# Patient Record
Sex: MALE | Race: WHITE | NOT HISPANIC OR LATINO | Employment: FULL TIME | URBAN - NONMETROPOLITAN AREA
[De-identification: names, ages, dates, MRNs, and addresses within clinical notes are randomized per-mention and may not be internally consistent; named-entity substitution may affect disease eponyms.]

---

## 2021-08-03 ENCOUNTER — APPOINTMENT (OUTPATIENT)
Dept: CT IMAGING | Facility: HOSPITAL | Age: 66
End: 2021-08-03

## 2021-08-03 ENCOUNTER — APPOINTMENT (OUTPATIENT)
Dept: GENERAL RADIOLOGY | Facility: HOSPITAL | Age: 66
End: 2021-08-03

## 2021-08-03 ENCOUNTER — HOSPITAL ENCOUNTER (OUTPATIENT)
Facility: HOSPITAL | Age: 66
Setting detail: OBSERVATION
LOS: 1 days | Discharge: HOME | End: 2021-08-05
Attending: STUDENT IN AN ORGANIZED HEALTH CARE EDUCATION/TRAINING PROGRAM | Admitting: INTERNAL MEDICINE

## 2021-08-03 DIAGNOSIS — I63.432 CEREBROVASCULAR ACCIDENT (CVA) DUE TO EMBOLISM OF LEFT POSTERIOR CEREBRAL ARTERY (HCC): ICD-10-CM

## 2021-08-03 DIAGNOSIS — Z74.09 DECREASED FUNCTIONAL MOBILITY AND ENDURANCE: ICD-10-CM

## 2021-08-03 DIAGNOSIS — R20.0 FACIAL NUMBNESS: Primary | ICD-10-CM

## 2021-08-03 DIAGNOSIS — Z78.9 IMPAIRED MOBILITY AND ACTIVITIES OF DAILY LIVING: ICD-10-CM

## 2021-08-03 DIAGNOSIS — I48.91 ATRIAL FIBRILLATION, UNSPECIFIED TYPE (HCC): ICD-10-CM

## 2021-08-03 DIAGNOSIS — Z74.09 IMPAIRED MOBILITY AND ACTIVITIES OF DAILY LIVING: ICD-10-CM

## 2021-08-03 LAB
ABO GROUP BLD: NORMAL
ALBUMIN SERPL-MCNC: 4 G/DL (ref 3.5–5.2)
ALBUMIN/GLOB SERPL: 1.5 G/DL
ALP SERPL-CCNC: 85 U/L (ref 39–117)
ALT SERPL W P-5'-P-CCNC: 12 U/L (ref 1–41)
ANION GAP SERPL CALCULATED.3IONS-SCNC: 10 MMOL/L (ref 5–15)
AST SERPL-CCNC: 11 U/L (ref 1–40)
BASOPHILS # BLD AUTO: 0.04 10*3/MM3 (ref 0–0.2)
BASOPHILS NFR BLD AUTO: 0.4 % (ref 0–1.5)
BILIRUB SERPL-MCNC: 0.2 MG/DL (ref 0–1.2)
BLD GP AB SCN SERPL QL: NEGATIVE
BUN SERPL-MCNC: 11 MG/DL (ref 8–23)
BUN/CREAT SERPL: 9.8 (ref 7–25)
CALCIUM SPEC-SCNC: 8.7 MG/DL (ref 8.6–10.5)
CHLORIDE SERPL-SCNC: 103 MMOL/L (ref 98–107)
CO2 SERPL-SCNC: 25 MMOL/L (ref 22–29)
CREAT SERPL-MCNC: 1.12 MG/DL (ref 0.76–1.27)
DEPRECATED RDW RBC AUTO: 46.8 FL (ref 37–54)
EOSINOPHIL # BLD AUTO: 0.2 10*3/MM3 (ref 0–0.4)
EOSINOPHIL NFR BLD AUTO: 1.9 % (ref 0.3–6.2)
ERYTHROCYTE [DISTWIDTH] IN BLOOD BY AUTOMATED COUNT: 13.2 % (ref 12.3–15.4)
FLUAV RNA RESP QL NAA+PROBE: NOT DETECTED
FLUBV RNA RESP QL NAA+PROBE: NOT DETECTED
GFR SERPL CREATININE-BSD FRML MDRD: 66 ML/MIN/1.73
GLOBULIN UR ELPH-MCNC: 2.6 GM/DL
GLUCOSE SERPL-MCNC: 97 MG/DL (ref 65–99)
HCT VFR BLD AUTO: 42 % (ref 37.5–51)
HGB BLD-MCNC: 14.1 G/DL (ref 13–17.7)
HOLD SPECIMEN: NORMAL
HOLD SPECIMEN: NORMAL
IMM GRANULOCYTES # BLD AUTO: 0.04 10*3/MM3 (ref 0–0.05)
IMM GRANULOCYTES NFR BLD AUTO: 0.4 % (ref 0–0.5)
INR PPP: 0.91 (ref 0.8–1.2)
LYMPHOCYTES # BLD AUTO: 1.55 10*3/MM3 (ref 0.7–3.1)
LYMPHOCYTES NFR BLD AUTO: 14.7 % (ref 19.6–45.3)
Lab: NORMAL
MCH RBC QN AUTO: 32.1 PG (ref 26.6–33)
MCHC RBC AUTO-ENTMCNC: 33.6 G/DL (ref 31.5–35.7)
MCV RBC AUTO: 95.7 FL (ref 79–97)
MONOCYTES # BLD AUTO: 0.63 10*3/MM3 (ref 0.1–0.9)
MONOCYTES NFR BLD AUTO: 6 % (ref 5–12)
NEUTROPHILS NFR BLD AUTO: 76.6 % (ref 42.7–76)
NEUTROPHILS NFR BLD AUTO: 8.08 10*3/MM3 (ref 1.7–7)
NRBC BLD AUTO-RTO: 0 /100 WBC (ref 0–0.2)
PLATELET # BLD AUTO: 244 10*3/MM3 (ref 140–450)
PMV BLD AUTO: 10 FL (ref 6–12)
POTASSIUM SERPL-SCNC: 3.9 MMOL/L (ref 3.5–5.2)
PROT SERPL-MCNC: 6.6 G/DL (ref 6–8.5)
PROTHROMBIN TIME: 12.2 SECONDS (ref 11.1–15.3)
RBC # BLD AUTO: 4.39 10*6/MM3 (ref 4.14–5.8)
RH BLD: POSITIVE
SARS-COV-2 RNA RESP QL NAA+PROBE: NOT DETECTED
SODIUM SERPL-SCNC: 138 MMOL/L (ref 136–145)
T&S EXPIRATION DATE: NORMAL
WBC # BLD AUTO: 10.54 10*3/MM3 (ref 3.4–10.8)
WHOLE BLOOD HOLD SPECIMEN: NORMAL

## 2021-08-03 PROCEDURE — 86850 RBC ANTIBODY SCREEN: CPT | Performed by: STUDENT IN AN ORGANIZED HEALTH CARE EDUCATION/TRAINING PROGRAM

## 2021-08-03 PROCEDURE — 70450 CT HEAD/BRAIN W/O DYE: CPT

## 2021-08-03 PROCEDURE — G0378 HOSPITAL OBSERVATION PER HR: HCPCS

## 2021-08-03 PROCEDURE — 87636 SARSCOV2 & INF A&B AMP PRB: CPT | Performed by: STUDENT IN AN ORGANIZED HEALTH CARE EDUCATION/TRAINING PROGRAM

## 2021-08-03 PROCEDURE — 86901 BLOOD TYPING SEROLOGIC RH(D): CPT | Performed by: STUDENT IN AN ORGANIZED HEALTH CARE EDUCATION/TRAINING PROGRAM

## 2021-08-03 PROCEDURE — C9803 HOPD COVID-19 SPEC COLLECT: HCPCS

## 2021-08-03 PROCEDURE — 82962 GLUCOSE BLOOD TEST: CPT

## 2021-08-03 PROCEDURE — 70498 CT ANGIOGRAPHY NECK: CPT

## 2021-08-03 PROCEDURE — 86900 BLOOD TYPING SEROLOGIC ABO: CPT | Performed by: STUDENT IN AN ORGANIZED HEALTH CARE EDUCATION/TRAINING PROGRAM

## 2021-08-03 PROCEDURE — 70496 CT ANGIOGRAPHY HEAD: CPT

## 2021-08-03 PROCEDURE — 85610 PROTHROMBIN TIME: CPT | Performed by: STUDENT IN AN ORGANIZED HEALTH CARE EDUCATION/TRAINING PROGRAM

## 2021-08-03 PROCEDURE — 25010000002 ENOXAPARIN PER 10 MG: Performed by: INTERNAL MEDICINE

## 2021-08-03 PROCEDURE — 96372 THER/PROPH/DIAG INJ SC/IM: CPT

## 2021-08-03 PROCEDURE — 93010 ELECTROCARDIOGRAM REPORT: CPT | Performed by: INTERNAL MEDICINE

## 2021-08-03 PROCEDURE — 93005 ELECTROCARDIOGRAM TRACING: CPT | Performed by: STUDENT IN AN ORGANIZED HEALTH CARE EDUCATION/TRAINING PROGRAM

## 2021-08-03 PROCEDURE — 99284 EMERGENCY DEPT VISIT MOD MDM: CPT

## 2021-08-03 PROCEDURE — 0 IOPAMIDOL PER 1 ML: Performed by: STUDENT IN AN ORGANIZED HEALTH CARE EDUCATION/TRAINING PROGRAM

## 2021-08-03 PROCEDURE — 85025 COMPLETE CBC W/AUTO DIFF WBC: CPT | Performed by: STUDENT IN AN ORGANIZED HEALTH CARE EDUCATION/TRAINING PROGRAM

## 2021-08-03 PROCEDURE — 80053 COMPREHEN METABOLIC PANEL: CPT | Performed by: STUDENT IN AN ORGANIZED HEALTH CARE EDUCATION/TRAINING PROGRAM

## 2021-08-03 PROCEDURE — 71045 X-RAY EXAM CHEST 1 VIEW: CPT

## 2021-08-03 RX ORDER — SODIUM CHLORIDE 0.9 % (FLUSH) 0.9 %
10 SYRINGE (ML) INJECTION EVERY 12 HOURS SCHEDULED
Status: DISCONTINUED | OUTPATIENT
Start: 2021-08-03 | End: 2021-08-05 | Stop reason: HOSPADM

## 2021-08-03 RX ORDER — HYDROCODONE BITARTRATE AND ACETAMINOPHEN 5; 325 MG/1; MG/1
1 TABLET ORAL EVERY 4 HOURS PRN
Status: DISCONTINUED | OUTPATIENT
Start: 2021-08-03 | End: 2021-08-05 | Stop reason: HOSPADM

## 2021-08-03 RX ORDER — SODIUM CHLORIDE 0.9 % (FLUSH) 0.9 %
10 SYRINGE (ML) INJECTION AS NEEDED
Status: DISCONTINUED | OUTPATIENT
Start: 2021-08-03 | End: 2021-08-05 | Stop reason: HOSPADM

## 2021-08-03 RX ORDER — ERGOCALCIFEROL 1.25 MG/1
50000 CAPSULE ORAL
COMMUNITY
Start: 2021-07-02 | End: 2022-07-03

## 2021-08-03 RX ORDER — ASPIRIN 81 MG/1
81 TABLET, CHEWABLE ORAL DAILY
Status: DISCONTINUED | OUTPATIENT
Start: 2021-08-04 | End: 2021-08-04

## 2021-08-03 RX ORDER — CLOPIDOGREL BISULFATE 75 MG/1
300 TABLET ORAL ONCE
Status: COMPLETED | OUTPATIENT
Start: 2021-08-03 | End: 2021-08-03

## 2021-08-03 RX ORDER — HYDROCODONE BITARTRATE AND ACETAMINOPHEN 7.5; 325 MG/1; MG/1
1 TABLET ORAL EVERY 6 HOURS PRN
COMMUNITY
Start: 2021-07-30 | End: 2021-08-30

## 2021-08-03 RX ORDER — ASPIRIN 81 MG/1
324 TABLET, CHEWABLE ORAL ONCE
Status: COMPLETED | OUTPATIENT
Start: 2021-08-03 | End: 2021-08-03

## 2021-08-03 RX ORDER — ATORVASTATIN CALCIUM 40 MG/1
40 TABLET, FILM COATED ORAL NIGHTLY
Status: DISCONTINUED | OUTPATIENT
Start: 2021-08-03 | End: 2021-08-05 | Stop reason: HOSPADM

## 2021-08-03 RX ADMIN — ASPIRIN 324 MG: 81 TABLET, CHEWABLE ORAL at 18:58

## 2021-08-03 RX ADMIN — ENOXAPARIN SODIUM 40 MG: 40 INJECTION SUBCUTANEOUS at 23:05

## 2021-08-03 RX ADMIN — ATORVASTATIN CALCIUM 40 MG: 40 TABLET, FILM COATED ORAL at 23:04

## 2021-08-03 RX ADMIN — IOPAMIDOL 90 ML: 755 INJECTION, SOLUTION INTRAVENOUS at 18:44

## 2021-08-03 RX ADMIN — SODIUM CHLORIDE, POTASSIUM CHLORIDE, SODIUM LACTATE AND CALCIUM CHLORIDE 1000 ML: 600; 310; 30; 20 INJECTION, SOLUTION INTRAVENOUS at 19:01

## 2021-08-03 RX ADMIN — SODIUM CHLORIDE, PRESERVATIVE FREE 10 ML: 5 INJECTION INTRAVENOUS at 23:05

## 2021-08-03 RX ADMIN — CLOPIDOGREL BISULFATE 300 MG: 75 TABLET ORAL at 18:59

## 2021-08-03 NOTE — ED PROVIDER NOTES
Subjective   65-year-old male comes to the ER chief complaint of vision change, right-sided face numbness and droop that he noticed this morning when he woke up.  Last known normal was last night.  Patient did not think much of it and went to work when he noticed that he could not see out of the left side.  Patient reports having a history of an irregular heartbeat, but is not on a blood thinner.  He smokes daily and was diagnosed with high blood pressure in the past.  His symptoms currently have completely resolved except for some mild numbness on the right side.      History provided by:  Patient   used: No        Review of Systems   Constitutional: Negative for activity change, appetite change, chills, diaphoresis, fatigue and fever.   HENT: Negative for congestion and rhinorrhea.    Eyes: Positive for visual disturbance.   Respiratory: Negative for cough and shortness of breath.    Cardiovascular: Negative for chest pain and palpitations.   Gastrointestinal: Negative for abdominal pain and nausea.   Genitourinary: Negative for dysuria and flank pain.   Skin: Negative for color change and rash.   Neurological: Positive for facial asymmetry and numbness. Negative for dizziness, tremors, seizures, syncope, speech difficulty, weakness, light-headedness and headaches.   Psychiatric/Behavioral: Negative for agitation. The patient is not nervous/anxious.        Past Medical History:   Diagnosis Date   • Hypertension        No Known Allergies    History reviewed. No pertinent surgical history.    History reviewed. No pertinent family history.    Social History     Socioeconomic History   • Marital status: Single     Spouse name: Not on file   • Number of children: Not on file   • Years of education: Not on file   • Highest education level: Not on file   Tobacco Use   • Smoking status: Current Every Day Smoker     Packs/day: 1.00     Types: Cigars   • Smokeless tobacco: Never Used           Objective   "  Vitals:    08/03/21 1729 08/03/21 1752 08/03/21 1859 08/03/21 2011   BP: 139/95  120/85 145/91   BP Location: Right arm  Left arm Left arm   Patient Position: Sitting  Lying Lying   Pulse: 96  82 72   Resp: 20  16 16   Temp: 97.2 °F (36.2 °C)      TempSrc: Temporal      SpO2: 98%  98% 98%   Weight:  86.3 kg (190 lb 3 oz)     Height:  177.8 cm (70\")         Physical Exam  Vitals and nursing note reviewed.   Constitutional:       General: He is not in acute distress.     Appearance: He is well-developed. He is not ill-appearing, toxic-appearing or diaphoretic.   HENT:      Head: Normocephalic.      Right Ear: External ear normal.      Left Ear: External ear normal.   Eyes:      General: No visual field deficit.  Pulmonary:      Effort: Pulmonary effort is normal. No accessory muscle usage or respiratory distress.      Breath sounds: No decreased breath sounds or wheezing.   Chest:      Chest wall: No tenderness.   Abdominal:      General: Bowel sounds are normal.      Palpations: Abdomen is soft.      Tenderness: There is no abdominal tenderness (deep palpation).   Skin:     General: Skin is warm and dry.      Capillary Refill: Capillary refill takes less than 2 seconds.   Neurological:      Mental Status: He is alert and oriented to person, place, and time. Mental status is at baseline. He is not disoriented.      Cranial Nerves: No dysarthria or facial asymmetry.      Sensory: Sensory deficit (minimal right side face) present.      Motor: Motor function is intact. No weakness.      Coordination: Coordination is intact.   Psychiatric:         Behavior: Behavior normal.         ECG 12 Lead      Date/Time: 8/3/2021 8:35 PM  Performed by: Thierry Chowdhury MD  Authorized by: Johnson Amaya MD   Interpreted by physician  Rhythm: atrial fibrillation  Rate: normal  BPM: 80  ST Segments: ST segments normal                   ED Course  ED Course as of Aug 03 2039   Tue Aug 03, 2021   1847 Patient checked out to Dr." [place] pending dispo.    []   2036 Findings discussed with patient and family member at bedside in the emergency department.  Dr. French is aware of the imaging results, and will continue to follow patient in hospital.    [CB]      ED Course User Index  [] Johnson Amaya MD  [CB] Thierry Chowdhury MD      Results for orders placed or performed during the hospital encounter of 08/03/21   COVID-19 and FLU A/B PCR - Swab, Nasopharynx    Specimen: Nasopharynx; Swab   Result Value Ref Range    COVID19 Not Detected Not Detected - Ref. Range    Influenza A PCR Not Detected Not Detected    Influenza B PCR Not Detected Not Detected   Comprehensive Metabolic Panel    Specimen: Blood   Result Value Ref Range    Glucose 97 65 - 99 mg/dL    BUN 11 8 - 23 mg/dL    Creatinine 1.12 0.76 - 1.27 mg/dL    Sodium 138 136 - 145 mmol/L    Potassium 3.9 3.5 - 5.2 mmol/L    Chloride 103 98 - 107 mmol/L    CO2 25.0 22.0 - 29.0 mmol/L    Calcium 8.7 8.6 - 10.5 mg/dL    Total Protein 6.6 6.0 - 8.5 g/dL    Albumin 4.00 3.50 - 5.20 g/dL    ALT (SGPT) 12 1 - 41 U/L    AST (SGOT) 11 1 - 40 U/L    Alkaline Phosphatase 85 39 - 117 U/L    Total Bilirubin 0.2 0.0 - 1.2 mg/dL    eGFR Non African Amer 66 >60 mL/min/1.73    Globulin 2.6 gm/dL    A/G Ratio 1.5 g/dL    BUN/Creatinine Ratio 9.8 7.0 - 25.0    Anion Gap 10.0 5.0 - 15.0 mmol/L   Protime-INR    Specimen: Blood   Result Value Ref Range    Protime 12.2 11.1 - 15.3 Seconds    INR 0.91 0.80 - 1.20   CBC Auto Differential    Specimen: Blood   Result Value Ref Range    WBC 10.54 3.40 - 10.80 10*3/mm3    RBC 4.39 4.14 - 5.80 10*6/mm3    Hemoglobin 14.1 13.0 - 17.7 g/dL    Hematocrit 42.0 37.5 - 51.0 %    MCV 95.7 79.0 - 97.0 fL    MCH 32.1 26.6 - 33.0 pg    MCHC 33.6 31.5 - 35.7 g/dL    RDW 13.2 12.3 - 15.4 %    RDW-SD 46.8 37.0 - 54.0 fl    MPV 10.0 6.0 - 12.0 fL    Platelets 244 140 - 450 10*3/mm3    Neutrophil % 76.6 (H) 42.7 - 76.0 %    Lymphocyte % 14.7 (L) 19.6 - 45.3 %    Monocyte %  6.0 5.0 - 12.0 %    Eosinophil % 1.9 0.3 - 6.2 %    Basophil % 0.4 0.0 - 1.5 %    Immature Grans % 0.4 0.0 - 0.5 %    Neutrophils, Absolute 8.08 (H) 1.70 - 7.00 10*3/mm3    Lymphocytes, Absolute 1.55 0.70 - 3.10 10*3/mm3    Monocytes, Absolute 0.63 0.10 - 0.90 10*3/mm3    Eosinophils, Absolute 0.20 0.00 - 0.40 10*3/mm3    Basophils, Absolute 0.04 0.00 - 0.20 10*3/mm3    Immature Grans, Absolute 0.04 0.00 - 0.05 10*3/mm3    nRBC 0.0 0.0 - 0.2 /100 WBC   Type & Screen    Specimen: Blood   Result Value Ref Range    ABO Type O     RH type Positive     Antibody Screen Negative     T&S Expiration Date 8/6/2021 11:59:59 PM    PREVIOUS HISTORY    Specimen: Blood   Result Value Ref Range    Previous History No record on File    Green Top (Gel)   Result Value Ref Range    Extra Tube Hold for add-ons.    Lavender Top   Result Value Ref Range    Extra Tube hold for add-on    Gold Top - SST   Result Value Ref Range    Extra Tube Hold for add-ons.      CT Angiogram Head w AI Analysis of LVO   Final Result   1.  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 There is   collateral supply to the left PCA cortical branches. Follow-up   MRI is recommended.   2.  No other abnormality of the CTA brain.   3.  Negative CTA of the neck.      Electronically signed by:  Jeffrey Nails MD  8/3/2021 7:40 PM   CDT Workstation: [id number]      CT Angiogram Neck   Final Result   1.  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 There is   collateral supply to the left PCA cortical branches. Follow-up   MRI is recommended.   2.  No other abnormality of the CTA brain.   3.  Negative CTA of the neck.      Electronically signed by:  Jeffrey Nails MD  8/3/2021 7:40 PM   CDT Workstation: [id number]      XR Chest 1 View   Final Result   CONCLUSION:   No Acute Disease      [id number]      Electronically signed by:  Jeffery Dixon MD  8/3/2021 6:24 PM CDT   Workstation: [id number]      CT Head Without Contrast Stroke Protocol   Final Result    CONCLUSION:   No acute process.   Cerebral and cerebellar atrophy.   4 cm greatest dimension area of encephalomalacia right frontal   lobe, old infarct versus trauma or other insult.      [id number]      Electronically signed by:  Jeffery Dixon MD  8/3/2021 6:00 PM CDT   Workstation: [id number]            NIHSS (NIH Stroke Scale/Score) reviewed and/or performed as part of the patient evaluation and treatment planning process.  The result associated with this review/performance is: 1       MDM    Final diagnoses:   Facial numbness   Cerebrovascular accident (CVA) due to embolism of left posterior cerebral artery (CMS/HCC)   Atrial fibrillation, unspecified type (CMS/HCC)       ED Disposition  ED Disposition     ED Disposition Condition Comment    Decision to Admit  Level of Care: Stepdown [25]   Diagnosis: Facial numbness [035052]   Admitting Physician: GRISEL CORMIER [[id number]]   Attending Physician: GRISEL CORMIER [[id number]]            No follow-up provider specified.       Medication List      No changes were made to your prescriptions during this visit.          Thierry Chowdhury MD  08/03/21 2039       Thierry Chowdhury MD  08/03/21 2039

## 2021-08-04 ENCOUNTER — APPOINTMENT (OUTPATIENT)
Dept: CARDIOLOGY | Facility: HOSPITAL | Age: 66
End: 2021-08-04

## 2021-08-04 ENCOUNTER — APPOINTMENT (OUTPATIENT)
Dept: MRI IMAGING | Facility: HOSPITAL | Age: 66
End: 2021-08-04

## 2021-08-04 LAB
CHOLEST SERPL-MCNC: 183 MG/DL (ref 0–200)
GLUCOSE BLDC GLUCOMTR-MCNC: 95 MG/DL (ref 70–130)
HBA1C MFR BLD: 5.3 % (ref 4.8–5.6)
HDLC SERPL-MCNC: 34 MG/DL (ref 40–60)
LDLC SERPL CALC-MCNC: 126 MG/DL (ref 0–100)
LDLC/HDLC SERPL: 3.63 {RATIO}
LV EF 2D ECHO EST: 54 %
MAXIMAL PREDICTED HEART RATE: 157 BPM
STRESS TARGET HR: 133 BPM
TRIGL SERPL-MCNC: 128 MG/DL (ref 0–150)
VLDLC SERPL-MCNC: 23 MG/DL (ref 5–40)

## 2021-08-04 PROCEDURE — G0378 HOSPITAL OBSERVATION PER HR: HCPCS

## 2021-08-04 PROCEDURE — 80061 LIPID PANEL: CPT | Performed by: INTERNAL MEDICINE

## 2021-08-04 PROCEDURE — 93306 TTE W/DOPPLER COMPLETE: CPT

## 2021-08-04 PROCEDURE — 83036 HEMOGLOBIN GLYCOSYLATED A1C: CPT | Performed by: INTERNAL MEDICINE

## 2021-08-04 PROCEDURE — 97165 OT EVAL LOW COMPLEX 30 MIN: CPT

## 2021-08-04 PROCEDURE — 99204 OFFICE O/P NEW MOD 45 MIN: CPT | Performed by: NURSE PRACTITIONER

## 2021-08-04 PROCEDURE — 93306 TTE W/DOPPLER COMPLETE: CPT | Performed by: INTERNAL MEDICINE

## 2021-08-04 PROCEDURE — 36415 COLL VENOUS BLD VENIPUNCTURE: CPT | Performed by: INTERNAL MEDICINE

## 2021-08-04 PROCEDURE — 70551 MRI BRAIN STEM W/O DYE: CPT

## 2021-08-04 PROCEDURE — 97161 PT EVAL LOW COMPLEX 20 MIN: CPT

## 2021-08-04 PROCEDURE — 82962 GLUCOSE BLOOD TEST: CPT

## 2021-08-04 RX ORDER — HYDRALAZINE HYDROCHLORIDE 20 MG/ML
10 INJECTION INTRAMUSCULAR; INTRAVENOUS EVERY 6 HOURS PRN
Status: DISCONTINUED | OUTPATIENT
Start: 2021-08-04 | End: 2021-08-05 | Stop reason: HOSPADM

## 2021-08-04 RX ADMIN — APIXABAN 5 MG: 5 TABLET, FILM COATED ORAL at 23:21

## 2021-08-04 RX ADMIN — SODIUM CHLORIDE, PRESERVATIVE FREE 10 ML: 5 INJECTION INTRAVENOUS at 09:15

## 2021-08-04 RX ADMIN — ATORVASTATIN CALCIUM 40 MG: 40 TABLET, FILM COATED ORAL at 23:21

## 2021-08-04 NOTE — CONSULTS
"Stroke Consult Note    Patient Name: Filiberto Lynch   MRN: [id number]  Age: 65 y.o.  Sex: male  : 1955    Primary Care Physician: Refugio Espinoza MD  Referring Physician:  Thierry Chowdhury MD    TIME STROKE TEAM CALLED: 2030 CST     TIME PATIENT SEEN: 08:00 CST not acute, symptoms resolved    Handedness: Right  Race: White     Chief Complaint/Reason for Consultation: Right face numbness    HPI: Pt is a 65-yr-old right-handed white male with known diagnosis of Afib, not on anticoagulation d/t bruising, and smoker (Smokes one cigar/day)  presented with c/o right face numbness that he woke up with yesterday around 11:30. Stated his right lip and cheek were numb for about 30 minutes, along with some vision changes in his right eye in which he states \"the vision was gone in part of my eye.\" Stated the vision issue also lasted 30 mins. Stated he had some disorientation during this time as well. This morning he states feeling back to baseline. Strengths are equal 5/5, denies numbness or vision issues.     Last Known Normal Date/Time: 8/3 @ 11:30 CST     Review of Systems   HENT: Negative.    Respiratory: Negative.    Cardiovascular:        Afib   Gastrointestinal: Negative.    Genitourinary: Negative.    Musculoskeletal: Negative.    Skin: Negative.    Neurological: Negative.    Hematological: Negative.    Psychiatric/Behavioral: Negative.         Temp:  [97.2 °F (36.2 °C)] 97.2 °F (36.2 °C)  Heart Rate:  [72-96] 72  Resp:  [16-20] 16  BP: (120-145)/(85-95) 145/91    Neurological Exam  Mental Status  Awake, alert and oriented to person, place and time.Alert. Recent and remote memory are intact. Speech is normal. Language is fluent with no aphasia. Attention and concentration are normal. Fund of knowledge is appropriate for level of education.    Cranial Nerves  CN II: Visual acuity is normal. Visual fields full to confrontation.  CN III, IV, VI: Extraocular movements intact bilaterally. Normal lids and " orbits bilaterally. Pupils equal round and reactive to light bilaterally.  CN V: Facial sensation is normal.  CN VII: Full and symmetric facial movement.  CN IX, X: Palate elevates symmetrically. Normal gag reflex.  CN XI: Shoulder shrug strength is normal.  CN XII: Tongue midline without atrophy or fasciculations.    Motor  Normal muscle bulk throughout. No fasciculations present. Normal muscle tone. Strength is 5/5 throughout all four extremities.    Sensory  Sensation is intact to light touch, pinprick, vibration and proprioception in all four extremities.    Reflexes  Not assessed.    Coordination  Finger-to-nose, rapid alternating movements and heel-to-shin normal bilaterally without dysmetria.    Gait  Not assessed.      Physical Exam  Vitals and nursing note reviewed.   Constitutional:       Appearance: Normal appearance.   HENT:      Head: Normocephalic and atraumatic.   Eyes:      General: Lids are normal.      Extraocular Movements: Extraocular movements intact.      Pupils: Pupils are equal, round, and reactive to light.   Cardiovascular:      Rate and Rhythm: Rhythm irregular.   Pulmonary:      Effort: Pulmonary effort is normal.   Musculoskeletal:         General: Normal range of motion.      Cervical back: Normal range of motion.   Skin:     General: Skin is warm and dry.   Neurological:      Mental Status: He is alert and oriented to person, place, and time. Mental status is at baseline.      Coordination: Coordination is intact.      Deep Tendon Reflexes: Strength normal.   Psychiatric:         Mood and Affect: Mood normal.         Speech: Speech normal.         Acute Stroke Data    Alteplase (tPA) Inclusion / Exclusion Criteria    Time: 08:09 CDT  Person Administering Scale: ANTONIO Aguilar    Inclusion Criteria  [x]   18 years of age or greater   [x]   Onset of symptoms < 4.5 hours before beginning treatment (stroke onset = time patient was last seen well or without symptoms).   []   Diagnosis  of acute ischemic stroke causing measurable disabling deficit (Complete Hemianopia, Any Aphasia, Visual or Sensory Extinction, Any weakness limiting sustained effort against gravity)   []   Any remaining deficit considered potentially disabling in view of patient and practitioner   Exclusion criteria (Do not proceed with Alteplase if any are checked under exclusion criteria)  []   Onset unknown or GREATER than 4.5 hours   []   ICH on CT/MRI   []   CT demonstrates hypodensity representing acute or subacute infarct   []   Significant head trauma or prior stroke in the previous 3 months   []   Symptoms suggestive of subarachnoid hemorrhage   []   History of un-ruptured intracranial aneurysm GREATER than 10 mm   []   Recent intracranial or intraspinal surgery within the last 3 months   []   Arterial puncture at a non-compressible site in the previous 7 days   []   Active internal bleeding   []   Acute bleeding tendency   []   Platelet count LESS than 100,000 for known hematological diseases such as leukemia, thrombocytopenia or chronic cirrhosis   []   Current use of anticoagulant with INR GREATER than 1.7 or PT GREATER than 15 seconds, aPTT GREATER than 40 seconds   []   Heparin received within 48 hours, resulting in abnormally elevated aPTT GREATER than upper limit of normal   []   Current use of direct thrombin inhibitors or direct factor Xa inhibitors in the past 48 hours   []   Elevated blood pressure refractory to treatment (systolic GREATER than 185 mm/Hg or diastolic  GREATER than 110 mm/Hg   []   Suspected infective endocarditis and aortic arch dissection   []   Current use of therapeutic treatment dose of low-molecular-weight heparin (LMWH) within the previous 24 hours   []   Structural GI malignancy or bleed   Relative exclusion for all patients  []   Only minor non-disabling symptoms   []   Pregnancy   []   Seizure at onset with postictal residual neurological impairments   []   Major surgery or previous  trauma within past 14 days   []   History of previous spontaneous ICH, intracranial neoplasm, or AV malformation   []   Postpartum (within previous 14 days)   []   Recent GI or urinary tract hemorrhage (within previous 21 days)   []   Recent acute MI (within previous 3 months)   []   History of un-ruptured intracranial aneurysm LESS than 10 mm   []   History of ruptured intracranial aneurysm   []   Blood glucose LESS than 50 mg/dL (2.7 mmol/L)   []   Dural puncture within the last 7 days   []   Known GREATER than 10 cerebral microbleeds   Additional exclusions for patients with symptoms onset between 3 and 4.5 hours.  []   Age > 80.   []   On any anticoagulants regardless of INR  >>> Warfarin (Coumadin), Heparin, Enoxaparin (Lovenox), fondaparinux (Arixtra), bivalirudin (Angiomax), Argatroban, dabigatran (Pradaxa), rivaroxaban (Xarelto), or apixaban (Eliquis)   []   Severe stroke (NIHSS > 25).   []   History of BOTH diabetes and previous ischemic stroke.   []   The risks and benefits have been discussed with the patient or family related to the administration of IV Alteplase for stroke symptoms.   []   I have discussed and reviewed the patient's case and imaging with the attending prior to IV Alteplase.   N/A Time Alteplase administered       Past Medical History:   Diagnosis Date   • Hypertension      History reviewed. No pertinent surgical history.  History reviewed. No pertinent family history.  Social History     Socioeconomic History   • Marital status: Single     Spouse name: Not on file   • Number of children: Not on file   • Years of education: Not on file   • Highest education level: Not on file   Tobacco Use   • Smoking status: Current Every Day Smoker     Packs/day: 1.00     Types: Cigars   • Smokeless tobacco: Never Used     No Known Allergies  Prior to Admission medications    Medication Sig Start Date End Date Taking? Authorizing Provider   ergocalciferol (ERGOCALCIFEROL) 1.25 MG (87919 UT) capsule  Take 50,000 Units by mouth. 7/2/21 7/3/22 Yes Provider, MD Pérez   HYDROcodone-acetaminophen (NORCO) 7.5-325 MG per tablet Take 1 tablet by mouth Every 6 (Six) Hours As Needed. 21 Yes Provider, MD Pérez       [facility] Meds:  Scheduled- aspirin, 81 mg, Oral, Daily  atorvastatin, 40 mg, Oral, Nightly  enoxaparin, 40 mg, Subcutaneous, Q24H  sodium chloride, 10 mL, Intravenous, Q12H      Infusions-     PRNs- HYDROcodone-acetaminophen  •  sodium chloride  •  sodium chloride  •  sodium chloride    Functional Status Prior to Current Stroke/[place] Score: 0    NIH Stroke Scale  Time: 08:09 CDT  Person Administering Scale: ANTONIO Aguilar    1a  Level of consciousness: 0=alert; keenly responsive   1b. LOC questions:  0=Performs both tasks correctly   1c. LOC commands: 0=Performs both tasks correctly   2.  Best Gaze: 0=normal   3.  Visual: 0=No visual loss   4. Facial Palsy: 0=Normal symmetric movement   5a.  Motor left arm: 0=No drift, limb holds 90 (or 45) degrees for full 10 seconds   5b.  Motor right arm: 0=No drift, limb holds 90 (or 45) degrees for full 10 seconds   6a. motor left le=No drift, limb holds 90 (or 45) degrees for full 10 seconds   6b  Motor right le=No drift, limb holds 90 (or 45) degrees for full 10 seconds   7. Limb Ataxia: 0=Absent   8.  Sensory: 0=Normal; no sensory loss   9. Best Language:  0=No aphasia, normal   10. Dysarthria: 0=Normal   11. Extinction and Inattention: 0=No abnormality    Total:   0       Results Reviewed:  I have personally reviewed current lab, radiology, and data and agree with results.  Lab Results (last 24 hours)     Procedure Component Value Units Date/Time    Hemoglobin A1c [[id number]]  (Normal) Collected: 21    Specimen: Blood Updated: 21     Hemoglobin A1C 5.30 %     Narrative:      Hemoglobin A1C Ranges:    Increased Risk for Diabetes  5.7% to 6.4%  Diabetes                     >= 6.5%  Diabetic Goal                 < 7.0%    Lipid Panel [[id number]]  (Abnormal) Collected: 08/04/21 0704    Specimen: Blood Updated: 08/04/21 0730     Total Cholesterol 183 mg/dL      Triglycerides 128 mg/dL      HDL Cholesterol 34 mg/dL      LDL Cholesterol  126 mg/dL      VLDL Cholesterol 23 mg/dL      LDL/HDL Ratio 3.63    Narrative:      Cholesterol Reference Ranges  (U.S. Department of Health and Human Services ATP III Classifications)    Desirable          <200 mg/dL  Borderline High    200-239 mg/dL  High Risk          >240 mg/dL      Triglyceride Reference Ranges  (U.S. Department of Health and Human Services ATP III Classifications)    Normal           <150 mg/dL  Borderline High  150-199 mg/dL  High             200-499 mg/dL  Very High        >500 mg/dL    HDL Reference Ranges  (U.S. Department of Health and Human Services ATP III Classifcations)    Low     <40 mg/dl (major risk factor for CHD)  High    >60 mg/dl ('negative' risk factor for CHD)        LDL Reference Ranges  (U.S. Department of Health and Human Services ATP III Classifcations)    Optimal          <100 mg/dL  Near Optimal     100-129 mg/dL  Borderline High  130-159 mg/dL  High             160-189 mg/dL  Very High        >189 mg/dL    COVID-19 and FLU A/B PCR - Swab, Nasopharynx [[id number]]  (Normal) Collected: 08/03/21 1902    Specimen: Swab from Nasopharynx Updated: 08/03/21 1926     COVID19 Not Detected     Influenza A PCR Not Detected     Influenza B PCR Not Detected    Narrative:      Fact sheet for providers: https://www.fda.gov/media/329178/download    Fact sheet for patients: https://www.fda.gov/media/607536/download    Test performed by PCR.    [place] Draw [[id number]] Collected: 08/03/21 1803    Specimen: Blood Updated: 08/03/21 1915    Narrative:      The following orders were created for panel order [place] Draw.  Procedure                               Abnormality         Status                     ---------                               -----------          ------                     Green Top (Gel)[[id number]]                                  Final result               Lavender Top[[id number]]                                     Final result               Gold Top - SST[[id number]]                                   Final result                 Please view results for these tests on the individual orders.    Green Top (Gel) [[id number]] Collected: 08/03/21 1803    Specimen: Blood Updated: 08/03/21 1915     Extra Tube Hold for add-ons.     Comment: Auto resulted.       Lavender Top [[id number]] Collected: 08/03/21 1803    Specimen: Blood Updated: 08/03/21 1915     Extra Tube hold for add-on     Comment: Auto resulted       Gold Top - SST [[id number]] Collected: 08/03/21 1803    Specimen: Blood Updated: 08/03/21 1915     Extra Tube Hold for add-ons.     Comment: Auto resulted.       Protime-INR [[id number]]  (Normal) Collected: 08/03/21 1803    Specimen: Blood Updated: 08/03/21 1834     Protime 12.2 Seconds      INR 0.91    Narrative:      Therapeutic range for most indications is 2.0-3.0 INR,  or 2.5-3.5 for mechanical heart valves.    Comprehensive Metabolic Panel [[id number]] Collected: 08/03/21 1803    Specimen: Blood Updated: 08/03/21 1834     Glucose 97 mg/dL      BUN 11 mg/dL      Creatinine 1.12 mg/dL      Sodium 138 mmol/L      Potassium 3.9 mmol/L      Chloride 103 mmol/L      CO2 25.0 mmol/L      Calcium 8.7 mg/dL      Total Protein 6.6 g/dL      Albumin 4.00 g/dL      ALT (SGPT) 12 U/L      AST (SGOT) 11 U/L      Alkaline Phosphatase 85 U/L      Total Bilirubin 0.2 mg/dL      eGFR Non African Amer 66 mL/min/1.73      Globulin 2.6 gm/dL      A/G Ratio 1.5 g/dL      BUN/Creatinine Ratio 9.8     Anion Gap 10.0 mmol/L     Narrative:      GFR Normal >60  Chronic Kidney Disease <60  Kidney Failure <15      CBC & Differential [[id number]]  (Abnormal) Collected: 08/03/21 1803    Specimen: Blood Updated: 08/03/21 1813    Narrative:      The following orders were created for  panel order CBC & Differential.  Procedure                               Abnormality         Status                     ---------                               -----------         ------                     CBC Auto Differential[[id number]]        Abnormal            Final result                 Please view results for these tests on the individual orders.    CBC Auto Differential [[id number]]  (Abnormal) Collected: 08/03/21 1803    Specimen: Blood Updated: 08/03/21 1813     WBC 10.54 10*3/mm3      RBC 4.39 10*6/mm3      Hemoglobin 14.1 g/dL      Hematocrit 42.0 %      MCV 95.7 fL      MCH 32.1 pg      MCHC 33.6 g/dL      RDW 13.2 %      RDW-SD 46.8 fl      MPV 10.0 fL      Platelets 244 10*3/mm3      Neutrophil % 76.6 %      Lymphocyte % 14.7 %      Monocyte % 6.0 %      Eosinophil % 1.9 %      Basophil % 0.4 %      Immature Grans % 0.4 %      Neutrophils, Absolute 8.08 10*3/mm3      Lymphocytes, Absolute 1.55 10*3/mm3      Monocytes, Absolute 0.63 10*3/mm3      Eosinophils, Absolute 0.20 10*3/mm3      Basophils, Absolute 0.04 10*3/mm3      Immature Grans, Absolute 0.04 10*3/mm3      nRBC 0.0 /100 WBC         Imaging Results (Last 24 Hours)     Procedure Component Value Units Date/Time    CT Angiogram Head w AI Analysis of LVO [[id number]] Collected: 08/03/21 1832     Updated: 08/03/21 2313    Addenda:         ADDENDUM   ADDENDUM #1       These findings were communicated to   Dr. Chowdhury on 08/03/2021  7:31 PM. (CST)    Electronically signed by:  Jeffrey Nails MD  8/3/2021 11:12  PM CDT Workstation: [id number]    Signed: 08/03/21 2312 by Jeffrey Nails MD    Narrative:      EXAM DESCRIPTION:  CT ANGIOGRAM HEAD W AI ANALYSIS OF LVO (accession [id number]),  CT ANGIOGRAM NECK (accession [id number])     CLINICAL HISTORY:  65 years Male, weakness, vision changes    TECHNIQUE: Helical CT axial images are obtained from the base  thoracic inlet through the vertex with IV contrast. Multiplanar  reconstruction.  3D  image processing was also performed. NASCET  criteria using the distal ICAs for comparison were used for  evaluation of carotid stenosis. This exam was performed according  to our departmental dose-optimization program, which includes  automated exposure control, adjustment of the mA and/or kV  according to patient size and/or use of iterative reconstruction  technique.    COMPARISON: Noncontrast CT brain 8/3/2021 performed earlier same  day.      FINDINGS:  BRAIN:  Distal aspect of bilateral internal carotid arteries are normal  in caliber without focal stenosis or aneurysm. Mild  atherosclerotic calcification of bilateral cavernous ICAs without  significant narrowing.  There is mild to moderate hypoplasia of  the right A1 segment, normal variant. Right A2 and distal  branches are normal in caliber. Patent anterior communicating  artery. Left anterior cerebral artery and bilateral middle  cerebral arteries are within normal limits. .     Basilar artery is normal in caliber and morphology  without  high-grade stenosis or basilar tip aneurysm. Bilateral PCAs are  identified emanating from the basilar tip. There is occlusion of  the P3 branch of the left PCA. There is evidence of collateral  supply to the cortical branches of the left PCA. Right PCA is  within normal limits. Patent right PCOM. Non-visualized left  PCOM.    No aneurysm, arteriovenous malformation or other vascular  anomalies.      NECK:  RIGHT CAROTID: Normal appearing carotid bifurcation.  Cervical  course of the internal carotid artery is within normal limits  without focal stenosis, aneurysm, or dissection. Normal CCA.  Origin and proximal visualized branches of the external carotid  artery appears normal.     LEFT CAROTID: Normal appearing carotid bifurcation.  Cervical  course of the internal carotid artery is within normal limits  without focal stenosis, aneurysm, or dissection. Normal CCA.  Origin and proximal visualized branches of the external  carotid  artery appears normal.     VERTEBRAL: Bilateral vertebral arteries have a normal appearance  with left dominance.    OTHER:  Origin of the great vessels are within normal limits.    SOFT TISSUES:  Unremarkable.          Impression:      1.  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 There is  collateral supply to the left PCA cortical branches. Follow-up  MRI is recommended.  2.  No other abnormality of the CTA brain.  3.  Negative CTA of the neck.    Electronically signed by:  Jeffrey Nails MD  8/3/2021 7:40 PM  CDT Workstation: [id number]    CT Angiogram Neck [[id number]] Collected: 08/03/21 1832     Updated: 08/03/21 2313    Addenda:         ADDENDUM   ADDENDUM #1       These findings were communicated to   Dr. Chowdhury on 08/03/2021  7:31 PM. (CST)    Electronically signed by:  Jeffrey Nails MD  8/3/2021 11:12  PM CDT Workstation: [id number]    Signed: 08/03/21 2312 by Jeffrey Nails MD    Narrative:      EXAM DESCRIPTION:  CT ANGIOGRAM HEAD W AI ANALYSIS OF LVO (accession [id number]),  CT ANGIOGRAM NECK (accession [id number])     CLINICAL HISTORY:  65 years Male, weakness, vision changes    TECHNIQUE: Helical CT axial images are obtained from the base  thoracic inlet through the vertex with IV contrast. Multiplanar  reconstruction.  3D image processing was also performed. NASCET  criteria using the distal ICAs for comparison were used for  evaluation of carotid stenosis. This exam was performed according  to our departmental dose-optimization program, which includes  automated exposure control, adjustment of the mA and/or kV  according to patient size and/or use of iterative reconstruction  technique.    COMPARISON: Noncontrast CT brain 8/3/2021 performed earlier same  day.      FINDINGS:  BRAIN:  Distal aspect of bilateral internal carotid arteries are normal  in caliber without focal stenosis or aneurysm. Mild  atherosclerotic calcification of bilateral cavernous ICAs  without  significant narrowing.  There is mild to moderate hypoplasia of  the right A1 segment, normal variant. Right A2 and distal  branches are normal in caliber. Patent anterior communicating  artery. Left anterior cerebral artery and bilateral middle  cerebral arteries are within normal limits. .     Basilar artery is normal in caliber and morphology  without  high-grade stenosis or basilar tip aneurysm. Bilateral PCAs are  identified emanating from the basilar tip. There is occlusion of  the P3 branch of the left PCA. There is evidence of collateral  supply to the cortical branches of the left PCA. Right PCA is  within normal limits. Patent right PCOM. Non-visualized left  PCOM.    No aneurysm, arteriovenous malformation or other vascular  anomalies.      NECK:  RIGHT CAROTID: Normal appearing carotid bifurcation.  Cervical  course of the internal carotid artery is within normal limits  without focal stenosis, aneurysm, or dissection. Normal CCA.  Origin and proximal visualized branches of the external carotid  artery appears normal.     LEFT CAROTID: Normal appearing carotid bifurcation.  Cervical  course of the internal carotid artery is within normal limits  without focal stenosis, aneurysm, or dissection. Normal CCA.  Origin and proximal visualized branches of the external carotid  artery appears normal.     VERTEBRAL: Bilateral vertebral arteries have a normal appearance  with left dominance.    OTHER:  Origin of the great vessels are within normal limits.    SOFT TISSUES:  Unremarkable.          Impression:      1.  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 There is  collateral supply to the left PCA cortical branches. Follow-up  MRI is recommended.  2.  No other abnormality of the CTA brain.  3.  Negative CTA of the neck.    Electronically signed by:  Jeffrey Nails MD  8/3/2021 7:40 PM  CDT Workstation: [id number]    CT Head Without Contrast Stroke Protocol [[id number]] Collected: 08/03/21  1756     Updated: 08/03/21 1827    Narrative:        CT Head Without Contrast    History: Stroke    Axial scans of the brain were obtained without intravenous  contrast.  Coronal and sagital reconstructions were preformed.    This exam was performed according to our departmental  dose-optimization program, which includes automated exposure  control, adjustment of the mA and/or kV according to patient size  and/or use of iterative reconstruction technique.    DLP: 959.50    Comparison: None    Findings:  Patient is edentulous.  The visualized paranasal sinuses are unremarkable.    No acute process.  Cerebral and cerebellar atrophy.  4 cm greatest dimension area of encephalomalacia right frontal  lobe, old infarct versus trauma or other insult.  Incidental giant cisterna magna, a normal variant.  No hemorrhage.  No mass.  No abnormal areas of increased attenuation.  No midline shift.  No abnormal extra-axial fluid collections.      Impression:      CONCLUSION:  No acute process.  Cerebral and cerebellar atrophy.  4 cm greatest dimension area of encephalomalacia right frontal  lobe, old infarct versus trauma or other insult.    [id number]    Electronically signed by:  Jeffery Dixon MD  8/3/2021 6:00 PM CDT  Workstation: [email or web address]    XR Chest 1 View [[id number]] Collected: 08/03/21 1800     Updated: 08/03/21 1825    Narrative:        PORTABLE CHEST    HISTORY: Stroke protocol onset greater than 12 hours    Portable AP upright film of the chest was obtained at 5:56 PM.  COMPARISON: None    FINDINGS:   EKG leads.  The lungs are clear of an acute process.  The heart is not enlarged.  The pulmonary vasculature is not increased.  No pleural effusion.  No pneumothorax.  No acute osseous abnormality.  Degenerative changes are present in the thoracic spine.      Impression:      CONCLUSION:  No Acute Disease    [id number]    Electronically signed by:  Jeffery Dixon MD  8/3/2021 6:24 PM CDT  Workstation: [id number]            Assessment/Plan:  "Pt is a 65-yr-old right-handed white male with known diagnosis of Afib, not on anticoagulation d/t bruising, and smoker (Smokes one cigar/day)  presented with c/o right face numbness that he woke up with yesterday around 11:30. Stated his right lip and cheek were numb for about 30 minutes, along with some vision changes in his right eye in which he states \"the vision was gone in part of my eye.\" Stated the vision issue also lasted 30 mins. Stated he had some disorientation during this time as well. This morning he states feeling back to baseline. Strengths are equal 5/5, denies numbness or vision issues.   1. Right face numbness- Resolved, CT shows right frontal encephalomalacia, CTA shows left PCA occlusion, MRI shows new left PCA stroke and old ez PCA strokes and old right frontal stroke. All are likely cardioembolic. LDL is 126, A1C is 5.3. Will start Eliquis 5 mg BID, and Lipitor 80 mg/day for secondary stroke prevention.  2. Afib- He remains in afib, will startEliquis 5 mg BID. Instructed on the importance of anticoagulation and med compliance to reduce stroke risk. Pt agreed to the plan.  3. Normal BP goals.  4. Activity- PT/OT can evaluate today.  5. Diet- Heart-healthy.  Case was discussed with pt, Dr. French, Hospitalist team, and nursing. Thank you for the consult. Pt was seen through A/V interface.          1. Facial numbness    2. Cerebrovascular accident (CVA) due to embolism of left posterior cerebral artery (CMS/HCC)    3. Atrial fibrillation, unspecified type (CMS/HCC)            ANTONIO Aguilar  August 4, 2021  08:09 CDT              "

## 2021-08-04 NOTE — PLAN OF CARE
Goal Outcome Evaluation:  Plan of Care Reviewed With: patient           Outcome Summary: PT eval completed on this date. Patient independent for supine<>sit transfer, sit<>stand t/f and ambulation 150'x1 with no AD. Patient does present with increased stance time on RLE but this is from a previous injury to his L hip and back. HR in 110-130 during ambulation and standing activities. Balance: 27/28 on Tinetti balance and gait assessment which indicates low fall risk. Patient has good motor control of BLE. Patient would not benefit from skilled PT at this time. Will d/c orders at this time.

## 2021-08-04 NOTE — ED NOTES
This RN called  for list of providers for this patient.   was going to fax the list to Tiffanie Hayes RN  08/04/21 4315

## 2021-08-04 NOTE — THERAPY DISCHARGE NOTE
Patient Name: Filiberto Lynch  : 1955    MRN: [id number]                              Today's Date: 2021       Admit Date: 8/3/2021    Visit Dx:     ICD-10-CM ICD-9-CM   1. Facial numbness  R20.0 782.0   2. Cerebrovascular accident (CVA) due to embolism of left posterior cerebral artery (CMS/HCC)  I63.432 434.11   3. Atrial fibrillation, unspecified type (CMS/HCC)  I48.91 427.31   4. Decreased functional mobility and endurance  Z74.09 780.99     Patient Active Problem List   Diagnosis   • Facial numbness     Past Medical History:   Diagnosis Date   • Hypertension      History reviewed. No pertinent surgical history.  General Information     Row Name 21 1215          Physical Therapy Time and Intention    Document Type  evaluation  -LR     Mode of Treatment  co-treatment;physical therapy;occupational therapy  -LR     Row Name 21 1215          General Information    Patient Profile Reviewed  yes  -LR     Prior Level of Function  independent:;all household mobility;community mobility;gait;transfer;bed mobility;ADL's;driving glasses for reading, tub/shower combo  -LR     Existing Precautions/Restrictions  fall  -LR     Barriers to Rehab  visual deficit  -LR     Row Name 21 1215          Living Environment    Lives With  alone  -LR     Row Name 21 1215          Home Main Entrance    Number of Stairs, Main Entrance  three  -LR     Stair Railings, Main Entrance  none  -LR     Row Name 21 1215          Cognition    Orientation Status (Cognition)  oriented x 4  -LR     Row Name 21 1215          Safety Issues, Functional Mobility    Safety Issues Affecting Function (Mobility)  ability to follow commands;at risk behavior observed;awareness of need for assistance;safety precautions follow-through/compliance;safety precaution awareness;insight into deficits/self-awareness  -LR     Impairments Affecting Function (Mobility)  balance;coordination;strength;endurance/activity tolerance   -LR       User Key  (r) = Recorded By, (t) = Taken By, (c) = Cosigned By    Initials Name Provider Type    Eugene Srinivasan Physical Therapist        Mobility     Row Name 08/04/21 1215          Bed Mobility    Bed Mobility  supine-sit  -LR     Supine-Sit Esmeralda (Bed Mobility)  independent  -LR     Sit-Supine Esmeralda (Bed Mobility)  independent  -LR     Row Name 08/04/21 1215          Sit-Stand Transfer    Sit-Stand Esmeralda (Transfers)  independent  -LR     Row Name 08/04/21 1215          Gait/Stairs (Locomotion)    Esmeralda Level (Gait)  independent  -LR     Distance in Feet (Gait)  150'x1  -LR       User Key  (r) = Recorded By, (t) = Taken By, (c) = Cosigned By    Initials Name Provider Type    Eugene Srinivasan Physical Therapist        Obj/Interventions     Row Name 08/04/21 1215          Range of Motion Comprehensive    General Range of Motion  no range of motion deficits identified  -     Row Name 08/04/21 1215          Strength Comprehensive (MMT)    Comment, General Manual Muscle Testing (MMT) Assessment  BLE grossly WFL, No tone noted in BLE.  -LR       User Key  (r) = Recorded By, (t) = Taken By, (c) = Cosigned By    Initials Name Provider Type    Eugene Srinivasan Physical Therapist        Goals/Plan     Row Name 08/04/21 0815          Bed Mobility Goal 1 (PT)    Activity/Assistive Device (Bed Mobility Goal 1, PT)  --  -LR       User Key  (r) = Recorded By, (t) = Taken By, (c) = Cosigned By    Initials Name Provider Type    Eugene Srinivasan Physical Therapist        Clinical Impression     Row Name 08/04/21 1215          Pain    Additional Documentation  Pain Scale: Numbers Pre/Post-Treatment (Group)  -     Row Name 08/04/21 1215          Pain Scale: Numbers Pre/Post-Treatment    Pretreatment Pain Rating  0/10 - no pain  -LR     Posttreatment Pain Rating  0/10 - no pain  -     Row Name 08/04/21 1215          Plan of Care Review    Plan of Care Reviewed With  patient  -LR      Outcome Summary  PT eval completed on this date. Patient independent for supine<>sit transfer, sit<>stand t/f and ambulation 150'x1 with no AD. Patient does present with increased stance time on RLE but this is from a previous injury to his L hip and back. HR in 110-130 during ambulation and standing activities. Balance: 27/28 on Tinetti balance and gait assessment which indicates low fall risk. Patient has good motor control of BLE. Patient would not benefit from skilled PT at this time. Will d/c orders at this time.  -LR     Row Name 08/04/21 1215          Therapy Assessment/Plan (PT)    Criteria for Skilled Interventions Met (PT)  no;skilled treatment is necessary  -LR     Row Name 08/04/21 1215          Vital Signs    Pre Systolic BP Rehab  134  -LR     Pre Treatment Diastolic BP  99  -LR     Pretreatment Heart Rate (beats/min)  85  -LR     Pre SpO2 (%)  100  -LR     O2 Delivery Pre Treatment  room air  -LR     Pre Patient Position  Supine  -LR     Row Name 08/04/21 1215          Positioning and Restraints    Pre-Treatment Position  in bed  -LR     Post Treatment Position  bed  -LR     In Bed  notified nsg;call light within reach;encouraged to call for assist;sitting EOB  -LR       User Key  (r) = Recorded By, (t) = Taken By, (c) = Cosigned By    Initials Name Provider Type    LR Eugene Mason Physical Therapist        Outcome Measures     Row Name 08/04/21 1215          How much help from another person do you currently need...    Turning from your back to your side while in flat bed without using bedrails?  4  -LR     Moving from lying on back to sitting on the side of a flat bed without bedrails?  4  -LR     Moving to and from a bed to a chair (including a wheelchair)?  4  -LR     Standing up from a chair using your arms (e.g., wheelchair, bedside chair)?  4  -LR     Climbing 3-5 steps with a railing?  4  -LR     To walk in hospital room?  4  -LR     AM-PAC 6 Clicks Score (PT)  24  -LR     Row Name  "08/04/21 1215          Tinetti Assessment    Tinetti Assessment  yes  -LR     Sitting Balance  1  -LR     Arises  2  -LR     Attempts to Rise  2  -LR     Immediate Standing Balance (first 5 sec)  2  -LR     Standing Balance  2  -LR     Sternal Nudge (feet close together)  2  -LR     Eyes Closed (feet close together)  1  -LR     Turning 360 Degrees- Steps  1  -LR     Turning 360 Degrees- Steadiness  1  -LR     Sitting Down  2  -LR     Tinetti Balance Score  16  -LR     Gait Initiation (immediate after told \"go\")  1  -LR     Step Length- Right Swing  1  -LR     Step Length- Left Swing  1  -LR     Foot Clearance- Right Foot  1  -LR     Foot Clearance- Left Foot  1  -LR     Step Symmetry  1  -LR     Step Continuity  1  -LR     Path (excursion)  1  -LR     Trunk  2  -LR     Base of Support  1  -LR     Gait Score  11  -LR     Tinetti Total Score  27  -LR     Row Name 08/04/21 1216 08/04/21 1215       Functional Assessment    Outcome Measure Options  -PAC 6 Clicks Daily Activity (OT)  -  AM-PAC 6 Clicks Basic Mobility (PT);Tinetti  -LR      User Key  (r) = Recorded By, (t) = Taken By, (c) = Cosigned By    Initials Name Provider Type    LR Eugene Mason Physical Therapist     Oli Diaz OT Occupational Therapist          PT Recommendation and Plan     Plan of Care Reviewed With: patient  Outcome Summary: PT eval completed on this date. Patient independent for supine<>sit transfer, sit<>stand t/f and ambulation 150'x1 with no AD. Patient does present with increased stance time on RLE but this is from a previous injury to his L hip and back. HR in 110-130 during ambulation and standing activities. Balance: 27/28 on Tinetti balance and gait assessment which indicates low fall risk. Patient has good motor control of BLE. Patient would not benefit from skilled PT at this time. Will d/c orders at this time.     Time Calculation:   PT Charges     Row Name 08/04/21 1317             Time Calculation    Start Time  1215  " -LR      Stop Time  1255  -LR      Time Calculation (min)  40 min  -LR      PT Received On  08/04/21  -LR         Untimed Charges    PT Eval/Re-eval Minutes  40  -LR         Total Minutes    Untimed Charges Total Minutes  40  -LR       Total Minutes  40  -LR        User Key  (r) = Recorded By, (t) = Taken By, (c) = Cosigned By    Initials Name Provider Type    LR Eugene Mason Physical Therapist        Therapy Charges for Today     Code Description Service Date Service Provider Modifiers Qty    [id number] HC PT EVAL LOW COMPLEXITY 3 8/4/2021 Eugene Mason GP 1          PT G-Codes  Outcome Measure Options: AM-PAC 6 Clicks Daily Activity (OT)  AM-PAC 6 Clicks Score (PT): 24  AM-PAC 6 Clicks Score (OT): 24  Tinetti Total Score: 27    PT Discharge Summary  Anticipated Discharge Disposition (PT): home    Eugene Mason  8/4/2021

## 2021-08-04 NOTE — PROGRESS NOTES
[facility] Medicine Services  INPATIENT PROGRESS NOTE    Length of Stay: 1  Date of Admission: 8/3/2021  Primary Care Physician: Refugio Espinoza MD    Subjective   Chief Complaint: No complaints    HPI:    8/4/2021: Patient has no complaints today.  He was evaluated by neurology and started on Eliquis.  Patient had a left PCA stroke and some old ones noted on MRI today.      H&P by Dr. Connolly:  65-year-old male with a past medical history of atrial fibrillation not on anticoagulation presented to the hospital complaining of numbness on the right side of his face which started this afternoon.  He had no weakness in his arms or legs.  He had difficulty focusing on things.  No headache.  No chest pain or palpitations.  He has a history of atrial fibrillation and is not currently on anticoagulation.  He was on Coumadin in the past however stopped that about 3 years ago because of bruising.  He is not on aspirin.  He has no history of diabetes.    Review of Systems   Constitutional: Negative for activity change and fatigue.   HENT: Negative for ear pain and sore throat.    Eyes: Negative for pain and discharge.   Respiratory: Negative for cough and shortness of breath.    Cardiovascular: Negative for chest pain and palpitations.   Gastrointestinal: Negative for abdominal pain and nausea.   Endocrine: Negative for cold intolerance and heat intolerance.   Genitourinary: Negative for difficulty urinating and dysuria.   Musculoskeletal: Negative for arthralgias and gait problem.   Skin: Negative for color change and rash.   Neurological: Negative for dizziness and weakness.   Psychiatric/Behavioral: Negative for agitation and confusion.        Objective    Temp:  [97.2 °F (36.2 °C)-98.2 °F (36.8 °C)] 98.2 °F (36.8 °C)  Heart Rate:  [72-96] 78  Resp:  [16-20] 17  BP: (120-151)/() 136/91    Physical Exam  Constitutional:       Appearance: He is well-developed.   HENT:       Head: Normocephalic and atraumatic.   Eyes:      Pupils: Pupils are equal, round, and reactive to light.   Cardiovascular:      Rate and Rhythm: Normal rate and regular rhythm.   Pulmonary:      Effort: Pulmonary effort is normal.      Breath sounds: Normal breath sounds.   Abdominal:      General: Bowel sounds are normal.      Palpations: Abdomen is soft.   Musculoskeletal:         General: Normal range of motion.      Cervical back: Normal range of motion and neck supple.   Skin:     General: Skin is warm and dry.   Neurological:      Mental Status: He is alert and oriented to person, place, and time.   Psychiatric:         Behavior: Behavior normal.       Results Review:  I have reviewed the labs, radiology results, and diagnostic studies.    Laboratory Data:   Results from last 7 days   Lab Units 08/03/21  1803   SODIUM mmol/L 138   POTASSIUM mmol/L 3.9   CHLORIDE mmol/L 103   CO2 mmol/L 25.0   BUN mg/dL 11   CREATININE mg/dL 1.12   GLUCOSE mg/dL 97   CALCIUM mg/dL 8.7   BILIRUBIN mg/dL 0.2   ALK PHOS U/L 85   ALT (SGPT) U/L 12   AST (SGOT) U/L 11   ANION GAP mmol/L 10.0     Estimated Creatinine Clearance: 82.4 mL/min (by C-G formula based on SCr of 1.12 mg/dL).          Results from last 7 days   Lab Units 08/03/21  1803   WBC 10*3/mm3 10.54   HEMOGLOBIN g/dL 14.1   HEMATOCRIT % 42.0   PLATELETS 10*3/mm3 244     Results from last 7 days   Lab Units 08/03/21  1803   INR  0.91       Culture Data:   No results found for: BLOODCX  No results found for: URINECX  No results found for: RESPCX  No results found for: WOUNDCX  No results found for: STOOLCX  No components found for: BODYFLD    Radiology Data:   Imaging Results (Last 24 Hours)     Procedure Component Value Units Date/Time    MRI Brain Without Contrast [[id number]] Collected: 08/04/21 0843     Updated: 08/04/21 1020    Addenda:         ADDENDUM   ADDENDUM #1       Addendum: Clinician taking care of the patient was notified of  findings by phone at 10:19  AM on 8/4/2021.    Electronically signed by:  Jorge Tejada MD  8/4/2021 10:19 AM CDT  Workstation: [id number]    Signed: 08/04/21 1019 by Dick Tejada MD    Narrative:      Procedure: MRI brain without contrast    Reason for exam: Neuro deficit acute    FINDINGS: Multisequence multiplanar MR imaging of the brain was  performed without contrast. The diffusion weighted series reveals  two left medial temporal lobe linear gray matter foci of  increased signal which are lower signal on the apparent  coefficient map. One of these regions of abnormal signal measures  1.24 x 0.51 cm and the other one measures 1.14 x 0.22 cm. These  regions are consistent with small acute infarcts. The diffusion  weighted series is otherwise unremarkable. Right frontal lobe  gray and white matter focus of abnormal low signal on T1  weighting which is higher signal on FLAIR and T2 weighting. Left  inferior medial occipital lobe small linear foci of gray matter  increased signal on FLAIR which is lower signal on T1 weighting.  Bilateral frontal lobe and right parietal lobe subcortical deep  white matter very small foci of circular and oval increased  signal on FLAIR sequence. Cerebral and cerebellar parenchymal are  otherwise normal. Ventricular system and subarachnoid spaces are  normal.      Impression:      1.  The diffusion weighted series reveals two left medial  temporal lobe linear gray matter foci of increased signal which  are lower signal on the apparent coefficient map. One of these  regions of abnormal signal measures 1.24 x 0.51 cm and the other  one measures 1.14 x 0.22 cm. These regions are consistent with  small acute infarcts.   2.  Right frontal lobe gray and white matter focus of abnormal  signal is described above consistent with sequela from old  infarct in this region.  3.  Left inferior medial occipital lobe small focus of abnormal  signal is described above consistent with small old infarct in  this region.  4.   Bilateral frontal lobe and right parietal lobe subcortical  deep white matter small foci of abnormal signal on FLAIR sequence  described above consistent with chronic ischemic gliosis  secondary to microvascular disease.  5.  Remainder of MRI brain exam is unremarkable.    Electronically signed by:  Jorge Tejada MD  8/4/2021 9:47 AM CDT  Workstation: [id number]    CT Angiogram Head w AI Analysis of LVO [[id number]] Collected: 08/03/21 1832     Updated: 08/03/21 2313    Addenda:         ADDENDUM   ADDENDUM #1       These findings were communicated to   Dr. Chowdhury on 08/03/2021  7:31 PM. (CST)    Electronically signed by:  Jeffrey Nails MD  8/3/2021 11:12  PM CDT Workstation: [id number]    Signed: 08/03/21 2312 by Jeffrey Nails MD    Narrative:      EXAM DESCRIPTION:  CT ANGIOGRAM HEAD W AI ANALYSIS OF LVO (accession [id number]),  CT ANGIOGRAM NECK (accession [id number])     CLINICAL HISTORY:  65 years Male, weakness, vision changes    TECHNIQUE: Helical CT axial images are obtained from the base  thoracic inlet through the vertex with IV contrast. Multiplanar  reconstruction.  3D image processing was also performed. NASCET  criteria using the distal ICAs for comparison were used for  evaluation of carotid stenosis. This exam was performed according  to our departmental dose-optimization program, which includes  automated exposure control, adjustment of the mA and/or kV  according to patient size and/or use of iterative reconstruction  technique.    COMPARISON: Noncontrast CT brain 8/3/2021 performed earlier same  day.      FINDINGS:  BRAIN:  Distal aspect of bilateral internal carotid arteries are normal  in caliber without focal stenosis or aneurysm. Mild  atherosclerotic calcification of bilateral cavernous ICAs without  significant narrowing.  There is mild to moderate hypoplasia of  the right A1 segment, normal variant. Right A2 and distal  branches are normal in caliber. Patent anterior  communicating  artery. Left anterior cerebral artery and bilateral middle  cerebral arteries are within normal limits. .     Basilar artery is normal in caliber and morphology  without  high-grade stenosis or basilar tip aneurysm. Bilateral PCAs are  identified emanating from the basilar tip. There is occlusion of  the P3 branch of the left PCA. There is evidence of collateral  supply to the cortical branches of the left PCA. Right PCA is  within normal limits. Patent right PCOM. Non-visualized left  PCOM.    No aneurysm, arteriovenous malformation or other vascular  anomalies.      NECK:  RIGHT CAROTID: Normal appearing carotid bifurcation.  Cervical  course of the internal carotid artery is within normal limits  without focal stenosis, aneurysm, or dissection. Normal CCA.  Origin and proximal visualized branches of the external carotid  artery appears normal.     LEFT CAROTID: Normal appearing carotid bifurcation.  Cervical  course of the internal carotid artery is within normal limits  without focal stenosis, aneurysm, or dissection. Normal CCA.  Origin and proximal visualized branches of the external carotid  artery appears normal.     VERTEBRAL: Bilateral vertebral arteries have a normal appearance  with left dominance.    OTHER:  Origin of the great vessels are within normal limits.    SOFT TISSUES:  Unremarkable.          Impression:      1.  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 There is  collateral supply to the left PCA cortical branches. Follow-up  MRI is recommended.  2.  No other abnormality of the CTA brain.  3.  Negative CTA of the neck.    Electronically signed by:  Jeffrey Nails MD  8/3/2021 7:40 PM  CDT Workstation: [id number]    CT Angiogram Neck [[id number]] Collected: 08/03/21 1832     Updated: 08/03/21 2313    Addenda:         ADDENDUM   ADDENDUM #1       These findings were communicated to   Dr. Chowdhury on 08/03/2021  7:31 PM. (CST)    Electronically signed by:  Jeffrey  Jaqui MAST  8/3/2021 11:12  PM CDT Workstation: [id number]    Signed: 08/03/21 2312 by Jeffrey Nails MD    Narrative:      EXAM DESCRIPTION:  CT ANGIOGRAM HEAD W AI ANALYSIS OF LVO (accession [id number]),  CT ANGIOGRAM NECK (accession [id number])     CLINICAL HISTORY:  65 years Male, weakness, vision changes    TECHNIQUE: Helical CT axial images are obtained from the base  thoracic inlet through the vertex with IV contrast. Multiplanar  reconstruction.  3D image processing was also performed. NASCET  criteria using the distal ICAs for comparison were used for  evaluation of carotid stenosis. This exam was performed according  to our departmental dose-optimization program, which includes  automated exposure control, adjustment of the mA and/or kV  according to patient size and/or use of iterative reconstruction  technique.    COMPARISON: Noncontrast CT brain 8/3/2021 performed earlier same  day.      FINDINGS:  BRAIN:  Distal aspect of bilateral internal carotid arteries are normal  in caliber without focal stenosis or aneurysm. Mild  atherosclerotic calcification of bilateral cavernous ICAs without  significant narrowing.  There is mild to moderate hypoplasia of  the right A1 segment, normal variant. Right A2 and distal  branches are normal in caliber. Patent anterior communicating  artery. Left anterior cerebral artery and bilateral middle  cerebral arteries are within normal limits. .     Basilar artery is normal in caliber and morphology  without  high-grade stenosis or basilar tip aneurysm. Bilateral PCAs are  identified emanating from the basilar tip. There is occlusion of  the P3 branch of the left PCA. There is evidence of collateral  supply to the cortical branches of the left PCA. Right PCA is  within normal limits. Patent right PCOM. Non-visualized left  PCOM.    No aneurysm, arteriovenous malformation or other vascular  anomalies.      NECK:  RIGHT CAROTID: Normal appearing carotid  bifurcation.  Cervical  course of the internal carotid artery is within normal limits  without focal stenosis, aneurysm, or dissection. Normal CCA.  Origin and proximal visualized branches of the external carotid  artery appears normal.     LEFT CAROTID: Normal appearing carotid bifurcation.  Cervical  course of the internal carotid artery is within normal limits  without focal stenosis, aneurysm, or dissection. Normal CCA.  Origin and proximal visualized branches of the external carotid  artery appears normal.     VERTEBRAL: Bilateral vertebral arteries have a normal appearance  with left dominance.    OTHER:  Origin of the great vessels are within normal limits.    SOFT TISSUES:  Unremarkable.          Impression:      1.  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 There is  collateral supply to the left PCA cortical branches. Follow-up  MRI is recommended.  2.  No other abnormality of the CTA brain.  3.  Negative CTA of the neck.    Electronically signed by:  Jeffrey Nails MD  8/3/2021 7:40 PM  CDT Workstation: [id number]          I have reviewed the patient's current medications.     Assessment/Plan     Active Hospital Problems    Diagnosis  POA   • Facial numbness [R20.0]  Yes       Plan:    1.  Acute CVA, left PCA: Neurology consult appreciated.  Eliquis and Lipitor started.  PT, OT and speech therapy.  2.  Paroxysmal atrial fibrillation:  Rate controlled.  The patient was not anticoagulated previously.  Eliquis has been started. Case management consulted for cost.     Discharge Planning: I expect patient to be discharged to home in 1-2 days.    I confirmed that the patient's Advance Care Plan is present, code status is documented, or surrogate decision maker is listed in the patient's medical record.      I have utilized all available immediate resources to obtain, update, or review the patient's current medications.         This document has been electronically signed by ANTONIO Guajardo on  August 4, 2021 14:56 CDT

## 2021-08-04 NOTE — H&P
[facility] [place] Medicine Admission      Date of Admission: 8/3/2021      Primary Care Physician: Refugio Espinoza MD      Chief Complaint: Numbness of face    HPI: 65-year-old male with a past medical history of atrial fibrillation not on anticoagulation presented to the hospital complaining of numbness on the right side of his face which started this afternoon.  He had no weakness in his arms or legs.  He had difficulty focusing on things.  No headache.  No chest pain or palpitations.  He has a history of atrial fibrillation and is not currently on anticoagulation.  He was on Coumadin in the past however stopped that about 3 years ago because of bruising.  He is not on aspirin.  He has no history of diabetes.    Concurrent Medical History:  has a past medical history of Hypertension.    Past Surgical History:  has no past surgical history on file.    Family History: family history is not on file.  Atrial fibrillation    Social History:  reports that he has been smoking cigars. He has been smoking about 1.00 pack per day. He has never used smokeless tobacco.    Allergies: No Known Allergies    Medications:   Prior to Admission medications    Medication Sig Start Date End Date Taking? Authorizing Provider   ergocalciferol (ERGOCALCIFEROL) 1.25 MG (07425 UT) capsule Take 50,000 Units by mouth. 7/2/21 7/3/22 Yes Pérez Coronel MD   HYDROcodone-acetaminophen (NORCO) 7.5-325 MG per tablet Take 1 tablet by mouth Every 6 (Six) Hours As Needed. 7/30/21 8/30/21 Yes Pérez Coronel MD       Review of Systems:  Review of Systems   10 point review of system was obtained.  Please see HPI for positives.  Physical Exam:   Temp:  [97.2 °F (36.2 °C)] 97.2 °F (36.2 °C)  Heart Rate:  [72-96] 72  Resp:  [16-20] 16  BP: (120-145)/(85-95) 145/91  Physical Exam  Constitutional:       General: He is not in acute distress.  HENT:      Head: Normocephalic and atraumatic.   Eyes:       Extraocular Movements: Extraocular movements intact.   Cardiovascular:      Rate and Rhythm: Normal rate. Rhythm irregular.   Pulmonary:      Effort: Pulmonary effort is normal. No respiratory distress.      Breath sounds: Normal breath sounds. No wheezing.   Abdominal:      General: Bowel sounds are normal. There is no distension.      Palpations: Abdomen is soft.   Musculoskeletal:         General: No swelling.      Cervical back: Normal range of motion.   Neurological:      General: No focal deficit present.      Mental Status: He is oriented to person, place, and time.      Cranial Nerves: No cranial nerve deficit.      Sensory: No sensory deficit.      Motor: No weakness.           Results Reviewed:  I have personally reviewed current lab, radiology, and data and agree with results.  Lab Results (last 24 hours)     Procedure Component Value Units Date/Time    COVID-19 and FLU A/B PCR - Swab, Nasopharynx [[id number]]  (Normal) Collected: 08/03/21 1902    Specimen: Swab from Nasopharynx Updated: 08/03/21 1926     COVID19 Not Detected     Influenza A PCR Not Detected     Influenza B PCR Not Detected    Narrative:      Fact sheet for providers: https://www.fda.gov/media/389910/download    Fact sheet for patients: https://www.fda.gov/media/786070/download    Test performed by PCR.    [place] Draw [[id number]] Collected: 08/03/21 1803    Specimen: Blood Updated: 08/03/21 1915    Narrative:      The following orders were created for panel order [place] Draw.  Procedure                               Abnormality         Status                     ---------                               -----------         ------                     Green Top (Gel)[[id number]]                                  Final result               Lavender Top[[id number]]                                     Final result               Gold Top - SST[[id number]]                                   Final result                 Please view results for these  tests on the individual orders.    Green Top (Gel) [[id number]] Collected: 08/03/21 1803    Specimen: Blood Updated: 08/03/21 1915     Extra Tube Hold for add-ons.     Comment: Auto resulted.       Lavender Top [[id number]] Collected: 08/03/21 1803    Specimen: Blood Updated: 08/03/21 1915     Extra Tube hold for add-on     Comment: Auto resulted       Gold Top - SST [[id number]] Collected: 08/03/21 1803    Specimen: Blood Updated: 08/03/21 1915     Extra Tube Hold for add-ons.     Comment: Auto resulted.       Protime-INR [[id number]]  (Normal) Collected: 08/03/21 1803    Specimen: Blood Updated: 08/03/21 1834     Protime 12.2 Seconds      INR 0.91    Narrative:      Therapeutic range for most indications is 2.0-3.0 INR,  or 2.5-3.5 for mechanical heart valves.    Comprehensive Metabolic Panel [[id number]] Collected: 08/03/21 1803    Specimen: Blood Updated: 08/03/21 1834     Glucose 97 mg/dL      BUN 11 mg/dL      Creatinine 1.12 mg/dL      Sodium 138 mmol/L      Potassium 3.9 mmol/L      Chloride 103 mmol/L      CO2 25.0 mmol/L      Calcium 8.7 mg/dL      Total Protein 6.6 g/dL      Albumin 4.00 g/dL      ALT (SGPT) 12 U/L      AST (SGOT) 11 U/L      Alkaline Phosphatase 85 U/L      Total Bilirubin 0.2 mg/dL      eGFR Non African Amer 66 mL/min/1.73      Globulin 2.6 gm/dL      A/G Ratio 1.5 g/dL      BUN/Creatinine Ratio 9.8     Anion Gap 10.0 mmol/L     Narrative:      GFR Normal >60  Chronic Kidney Disease <60  Kidney Failure <15      CBC & Differential [[id number]]  (Abnormal) Collected: 08/03/21 1803    Specimen: Blood Updated: 08/03/21 1813    Narrative:      The following orders were created for panel order CBC & Differential.  Procedure                               Abnormality         Status                     ---------                               -----------         ------                     CBC Auto Differential[[id number]]        Abnormal            Final result                 Please view results for  these tests on the individual orders.    CBC Auto Differential [[id number]]  (Abnormal) Collected: 08/03/21 1803    Specimen: Blood Updated: 08/03/21 1813     WBC 10.54 10*3/mm3      RBC 4.39 10*6/mm3      Hemoglobin 14.1 g/dL      Hematocrit 42.0 %      MCV 95.7 fL      MCH 32.1 pg      MCHC 33.6 g/dL      RDW 13.2 %      RDW-SD 46.8 fl      MPV 10.0 fL      Platelets 244 10*3/mm3      Neutrophil % 76.6 %      Lymphocyte % 14.7 %      Monocyte % 6.0 %      Eosinophil % 1.9 %      Basophil % 0.4 %      Immature Grans % 0.4 %      Neutrophils, Absolute 8.08 10*3/mm3      Lymphocytes, Absolute 1.55 10*3/mm3      Monocytes, Absolute 0.63 10*3/mm3      Eosinophils, Absolute 0.20 10*3/mm3      Basophils, Absolute 0.04 10*3/mm3      Immature Grans, Absolute 0.04 10*3/mm3      nRBC 0.0 /100 WBC         Imaging Results (Last 24 Hours)     Procedure Component Value Units Date/Time    CT Angiogram Head w AI Analysis of LVO [[id number]] Collected: 08/03/21 1832     Updated: 08/03/21 1941    Narrative:      EXAM DESCRIPTION:  CT ANGIOGRAM HEAD W AI ANALYSIS OF LVO (accession [id number]),  CT ANGIOGRAM NECK (accession [id number])     CLINICAL HISTORY:  65 years Male, weakness, vision changes    TECHNIQUE: Helical CT axial images are obtained from the base  thoracic inlet through the vertex with IV contrast. Multiplanar  reconstruction.  3D image processing was also performed. NASCET  criteria using the distal ICAs for comparison were used for  evaluation of carotid stenosis. This exam was performed according  to our departmental dose-optimization program, which includes  automated exposure control, adjustment of the mA and/or kV  according to patient size and/or use of iterative reconstruction  technique.    COMPARISON: Noncontrast CT brain 8/3/2021 performed earlier same  day.      FINDINGS:  BRAIN:  Distal aspect of bilateral internal carotid arteries are normal  in caliber without focal stenosis or aneurysm.  Mild  atherosclerotic calcification of bilateral cavernous ICAs without  significant narrowing.  There is mild to moderate hypoplasia of  the right A1 segment, normal variant. Right A2 and distal  branches are normal in caliber. Patent anterior communicating  artery. Left anterior cerebral artery and bilateral middle  cerebral arteries are within normal limits. .     Basilar artery is normal in caliber and morphology  without  high-grade stenosis or basilar tip aneurysm. Bilateral PCAs are  identified emanating from the basilar tip. There is occlusion of  the P3 branch of the left PCA. There is evidence of collateral  supply to the cortical branches of the left PCA. Right PCA is  within normal limits. Patent right PCOM. Non-visualized left  PCOM.    No aneurysm, arteriovenous malformation or other vascular  anomalies.      NECK:  RIGHT CAROTID: Normal appearing carotid bifurcation.  Cervical  course of the internal carotid artery is within normal limits  without focal stenosis, aneurysm, or dissection. Normal CCA.  Origin and proximal visualized branches of the external carotid  artery appears normal.     LEFT CAROTID: Normal appearing carotid bifurcation.  Cervical  course of the internal carotid artery is within normal limits  without focal stenosis, aneurysm, or dissection. Normal CCA.  Origin and proximal visualized branches of the external carotid  artery appears normal.     VERTEBRAL: Bilateral vertebral arteries have a normal appearance  with left dominance.    OTHER:  Origin of the great vessels are within normal limits.    SOFT TISSUES:  Unremarkable.          Impression:      1.  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 There is  collateral supply to the left PCA cortical branches. Follow-up  MRI is recommended.  2.  No other abnormality of the CTA brain.  3.  Negative CTA of the neck.    Electronically signed by:  Jeffrey Nails MD  8/3/2021 7:40 PM  CDT Workstation: [id number]    CT  Angiogram Neck [[id number]] Collected: 08/03/21 1832     Updated: 08/03/21 1941    Narrative:      EXAM DESCRIPTION:  CT ANGIOGRAM HEAD W AI ANALYSIS OF LVO (accession [id number]),  CT ANGIOGRAM NECK (accession [id number])     CLINICAL HISTORY:  65 years Male, weakness, vision changes    TECHNIQUE: Helical CT axial images are obtained from the base  thoracic inlet through the vertex with IV contrast. Multiplanar  reconstruction.  3D image processing was also performed. NASCET  criteria using the distal ICAs for comparison were used for  evaluation of carotid stenosis. This exam was performed according  to our departmental dose-optimization program, which includes  automated exposure control, adjustment of the mA and/or kV  according to patient size and/or use of iterative reconstruction  technique.    COMPARISON: Noncontrast CT brain 8/3/2021 performed earlier same  day.      FINDINGS:  BRAIN:  Distal aspect of bilateral internal carotid arteries are normal  in caliber without focal stenosis or aneurysm. Mild  atherosclerotic calcification of bilateral cavernous ICAs without  significant narrowing.  There is mild to moderate hypoplasia of  the right A1 segment, normal variant. Right A2 and distal  branches are normal in caliber. Patent anterior communicating  artery. Left anterior cerebral artery and bilateral middle  cerebral arteries are within normal limits. .     Basilar artery is normal in caliber and morphology  without  high-grade stenosis or basilar tip aneurysm. Bilateral PCAs are  identified emanating from the basilar tip. There is occlusion of  the P3 branch of the left PCA. There is evidence of collateral  supply to the cortical branches of the left PCA. Right PCA is  within normal limits. Patent right PCOM. Non-visualized left  PCOM.    No aneurysm, arteriovenous malformation or other vascular  anomalies.      NECK:  RIGHT CAROTID: Normal appearing carotid bifurcation.  Cervical  course of the internal  carotid artery is within normal limits  without focal stenosis, aneurysm, or dissection. Normal CCA.  Origin and proximal visualized branches of the external carotid  artery appears normal.     LEFT CAROTID: Normal appearing carotid bifurcation.  Cervical  course of the internal carotid artery is within normal limits  without focal stenosis, aneurysm, or dissection. Normal CCA.  Origin and proximal visualized branches of the external carotid  artery appears normal.     VERTEBRAL: Bilateral vertebral arteries have a normal appearance  with left dominance.    OTHER:  Origin of the great vessels are within normal limits.    SOFT TISSUES:  Unremarkable.          Impression:      1.  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 There is  collateral supply to the left PCA cortical branches. Follow-up  MRI is recommended.  2.  No other abnormality of the CTA brain.  3.  Negative CTA of the neck.    Electronically signed by:  Jeffrey Nails MD  8/3/2021 7:40 PM  CDT Workstation: [id number]    CT Head Without Contrast Stroke Protocol [[id number]] Collected: 08/03/21 1756     Updated: 08/03/21 1827    Narrative:        CT Head Without Contrast    History: Stroke    Axial scans of the brain were obtained without intravenous  contrast.  Coronal and sagital reconstructions were preformed.    This exam was performed according to our departmental  dose-optimization program, which includes automated exposure  control, adjustment of the mA and/or kV according to patient size  and/or use of iterative reconstruction technique.    DLP: 959.50    Comparison: None    Findings:  Patient is edentulous.  The visualized paranasal sinuses are unremarkable.    No acute process.  Cerebral and cerebellar atrophy.  4 cm greatest dimension area of encephalomalacia right frontal  lobe, old infarct versus trauma or other insult.  Incidental giant cisterna magna, a normal variant.  No hemorrhage.  No mass.  No abnormal areas of increased  attenuation.  No midline shift.  No abnormal extra-axial fluid collections.      Impression:      CONCLUSION:  No acute process.  Cerebral and cerebellar atrophy.  4 cm greatest dimension area of encephalomalacia right frontal  lobe, old infarct versus trauma or other insult.    [id number]    Electronically signed by:  Jeffery Dixon MD  8/3/2021 6:00 PM CDT  Workstation: [id number]    XR Chest 1 View [[id number]] Collected: 08/03/21 1800     Updated: 08/03/21 1825    Narrative:        PORTABLE CHEST    HISTORY: Stroke protocol onset greater than 12 hours    Portable AP upright film of the chest was obtained at 5:56 PM.  COMPARISON: None    FINDINGS:   EKG leads.  The lungs are clear of an acute process.  The heart is not enlarged.  The pulmonary vasculature is not increased.  No pleural effusion.  No pneumothorax.  No acute osseous abnormality.  Degenerative changes are present in the thoracic spine.      Impression:      CONCLUSION:  No Acute Disease    [id number]    Electronically signed by:  Jeffery Dixon MD  8/3/2021 6:24 PM CDT  Workstation: [id number]            Assessment:    Active Hospital Problems    Diagnosis    • Facial numbness              Plan:  Facial numbness.  Possible TIA versus stroke.  CT scan of the brain does not show any acute process.  There is cerebral and cerebellar atrophy.  4 cm area of encephalomalacia in the right frontal lobe.  CT angiogram shows branch occlusion of the P3 segment of the left PCA.  There is collateral supply to the left PCA.  No other abnormality on CT of the brain.  CTA of the neck was unremarkable.  He was evaluated by neurology in the emergency room.  They gave him Plavix and aspirin.  We will keep him on aspirin 81 mg daily.  We will put him on Lipitor.  We will check an MRI of the brain.  Will need to decide about anticoagulation due to his history of atrial fibrillation.  Will check an echocardiogram.    History of atrial fibrillation.  Not currently on anticoagulation.  Will  consider cardiology consult.  We will put him on echocardiogram.  The patient will likely need to be on anticoagulation.      Abby Connolly MD

## 2021-08-04 NOTE — ED NOTES
Still have not received the patient's provider name.  Called Ehsan to get provider's name.     Tiffanie Jaquez RN  08/04/21 [id number]

## 2021-08-04 NOTE — THERAPY DISCHARGE NOTE
Acute Care - Occupational Therapy Discharge  [facility]    Patient Name: Filiberto Lynch  : 1955    MRN: [id number]                              Today's Date: 2021       Admit Date: 8/3/2021    Visit Dx:     ICD-10-CM ICD-9-CM   1. Facial numbness  R20.0 782.0   2. Cerebrovascular accident (CVA) due to embolism of left posterior cerebral artery (CMS/HCC)  I63.432 434.11   3. Atrial fibrillation, unspecified type (CMS/HCC)  I48.91 427.31   4. Decreased functional mobility and endurance  Z74.09 780.99   5. Impaired mobility and activities of daily living  Z74.09 V49.89    Z78.9      Patient Active Problem List   Diagnosis   • Facial numbness     Past Medical History:   Diagnosis Date   • Hypertension      History reviewed. No pertinent surgical history.  General Information     [facility] Name 21 1216          OT Time and Intention    Document Type  evaluation  -     Mode of Treatment  co-treatment;occupational therapy;physical therapy  -     Row Name 21 1216          General Information    Patient Profile Reviewed  yes  -     Prior Level of Function  independent:;all household mobility;community mobility;gait;transfer;bed mobility;ADL's  -     Existing Precautions/Restrictions  fall  -     Row Name 21 1216          Living Environment    Lives With  alone  -     Row Name 21 1216          Home Main Entrance    Number of Stairs, Main Entrance  three  -     Stair Railings, Main Entrance  none  -     Row Name 21 1216          Stairs Within Home, Primary    Stairs, Within Home, Primary  Tub/shower  -     Number of Stairs, Within Home, Primary  none  -     Stair Railings, Within Home, Primary  none  -     Row Name 21 1216          Cognition    Orientation Status (Cognition)  oriented x 4  -     Row Name 21 1216          Safety Issues, Functional Mobility    Safety Issues Affecting Function (Mobility)  ability to follow commands;safety precautions  follow-through/compliance;safety precaution awareness;insight into deficits/self-awareness;impulsivity  -     Impairments Affecting Function (Mobility)  balance;coordination;strength;endurance/activity tolerance  -       User Key  (r) = Recorded By, (t) = Taken By, (c) = Cosigned By    Initials Name Provider Type    Oli Simmons OT Occupational Therapist        Mobility/ADL's     Row Name 08/04/21 1216          Bed Mobility    Bed Mobility  supine-sit;sit-supine  -     Supine-Sit [place] (Bed Mobility)  independent  -     Sit-Supine [place] (Bed Mobility)  independent  -[facility] Name 08/04/21 1216          Transfers    Transfers  sit-stand transfer  -     Sit-Stand [place] (Transfers)  independent  -JH     Row Name 08/04/21 1216          Functional Mobility    Functional Mobility- Ind. Level  standby assist  -[facility] Name 08/04/21 1216          Activities of Daily Living    BADL Assessment/Intervention  lower body dressing;grooming  -JH     Row Name 08/04/21 1216          Lower Body Dressing Assessment/Training    [place] Level (Lower Body Dressing)  lower body dressing skills;independent  -JH     Position (Lower Body Dressing)  edge of bed sitting  -[facility] Name 08/04/21 1216          Grooming Assessment/Training    [place] Level (Grooming)  grooming skills;independent  -     Position (Grooming)  edge of bed sitting  -       User Key  (r) = Recorded By, (t) = Taken By, (c) = Cosigned By    Initials Name Provider Type    Oli Simmons OT Occupational Therapist        Obj/Interventions     [facility] Name 08/04/21 1216          Sensory Assessment (Somatosensory)    Sensory Assessment (Somatosensory)  UE sensation intact;sensation intact  -JH     Row Name 08/04/21 1216          Vision Assessment/Intervention    Visual Impairment/Limitations  corrective lenses for reading  -[facility] Name 08/04/21 1216          Range of Motion Comprehensive    General Range of Motion   no range of motion deficits identified  -[facility] Name 08/04/21 1216          Strength Comprehensive (MMT)    Comment, General Manual Muscle Testing (MMT) Assessment  BUE MMT Strength 5/5 Grossly  -       User Key  (r) = Recorded By, (t) = Taken By, (c) = Cosigned By    Initials Name Provider Type     Oli Diaz OT Occupational Therapist        Goals/Plan     [facility] Name 08/04/21 [facility]6          Bed Mobility Goal 1 (OT)    Activity/Assistive Device (Bed Mobility Goal 1, OT)  --  -     [place] Level/Cues Needed (Bed Mobility Goal 1, OT)  --  -     Time Frame (Bed Mobility Goal 1, OT)  --  -     Progress/Outcomes (Bed Mobility Goal 1, OT)  --  -       User Key  (r) = Recorded By, (t) = Taken By, (c) = Cosigned By    Initials Name Provider Type     Oli Diaz OT Occupational Therapist        Clinical Impression     [facility] Name 08/04/21 [facility]6          Pain Assessment    Additional Documentation  Pain Scale: Numbers Pre/Post-Treatment (Group)  -JH     Row Name 08/04/21 1216          Pain Scale: Numbers Pre/Post-Treatment    Pretreatment Pain Rating  0/10 - no pain  -     Posttreatment Pain Rating  0/10 - no pain  -[facility] Name 08/04/21 1216          Plan of Care Review    Plan of Care Reviewed With  patient  -     Outcome Summary  OT Eval completed on this date as co-eval with PT. Pt pleasant and agreeable to therapy. Bed Mobility: Independent Transfers: Independent Grooming: Independent LBD: Independent Funct Mob: Independent. Pt demos no needs which required skilled OT intervention at this time. Recommend d/c home with assist.  -[facility] Name 08/04/21 1216          Therapy Assessment/Plan (OT)    Rehab Potential (OT)  --  -     Criteria for Skilled Therapeutic Interventions Met (OT)  no problems identified which require skilled intervention  -     Therapy Frequency (OT)  evaluation only  -     Predicted Duration of Therapy Intervention (OT)  --  -[facility] Name 08/04/21 1216           Therapy Plan Review/Discharge Plan (OT)    Anticipated Discharge Disposition (OT)  home with assist  -     Row Name 08/04/21 1216          Vital Signs    Pre Systolic BP Rehab  134  -     Pre Treatment Diastolic BP  99  -     Pretreatment Heart Rate (beats/min)  85  -     Pre SpO2 (%)  100  -     O2 Delivery Pre Treatment  room air  -     Pre Patient Position  Supine  -     Row Name 08/04/21 1216          Positioning and Restraints    Pre-Treatment Position  in bed  -     Post Treatment Position  bed  -     In Bed  supine;call light within reach;encouraged to call for assist;side rails up x2;notified [facility]  -       User Key  (r) = Recorded By, (t) = Taken By, (c) = Cosigned By    Initials Name Provider Type     Oli Diaz, RAVI Occupational Therapist        Outcome Measures     Row Name 08/04/21 1216          How much help from another is currently needed...    Putting on and taking off regular lower body clothing?  4  -JH     Bathing (including washing, rinsing, and drying)  4  -JH     Toileting (which includes using toilet bed pan or urinal)  4  -JH     Putting on and taking off regular upper body clothing  4  -JH     Taking care of personal grooming (such as brushing teeth)  4  -JH     Eating meals  4  -JH     AM-PAC 6 Clicks Score (OT)  24  -JH     Row Name 08/04/21 1215          How much help from another person do you currently need...    Turning from your back to your side while in flat bed without using bedrails?  4  -LR     Moving from lying on back to sitting on the side of a flat bed without bedrails?  4  -LR     Moving to and from a bed to a chair (including a wheelchair)?  4  -LR     Standing up from a chair using your arms (e.g., wheelchair, bedside chair)?  4  -LR     Climbing 3-5 steps with a railing?  4  -LR     To walk in hospital room?  4  -LR     AM-PAC 6 Clicks Score (PT)  24  -LR     Row Name 08/04/21 1216 08/04/21 1215       Functional Assessment    Outcome Measure Options   AM-PAC 6 Clicks Daily Activity (OT)  -  AM-PAC 6 Clicks Basic Mobility (PT);Tinetti  -LR      User Key  (r) = Recorded By, (t) = Taken By, (c) = Cosigned By    Initials Name Provider Type    LR Eugene Mason Physical Therapist     Oli Diaz OT Occupational Therapist        Occupational Therapy Education                 Title: PT OT SLP Therapies (In Progress)     Topic: Occupational Therapy (In Progress)     Point: ADL training (Not Started)     Description:   Instruct learner(s) on proper safety adaptation and remediation techniques during self care or transfers.   Instruct in proper use of assistive devices.              Learner Progress:  Not documented in this visit.          Point: Home exercise program (Not Started)     Description:   Instruct learner(s) on appropriate technique for monitoring, assisting and/or progressing therapeutic exercises/activities.              Learner Progress:  Not documented in this visit.          Point: Precautions (Done)     Description:   Instruct learner(s) on prescribed precautions during self-care and functional transfers.              Learning Progress Summary           Patient Acceptance, E, VU by  at 8/4/2021 1238    Comment: Pt eductaed on role of OT, d/c recs, and POC                   Point: Body mechanics (Not Started)     Description:   Instruct learner(s) on proper positioning and spine alignment during self-care, functional mobility activities and/or exercises.              Learner Progress:  Not documented in this visit.                      User Key     Initials Effective Dates Name Provider Type Discipline     06/16/21 -  Oli Diaz OT Occupational Therapist OT              OT Recommendation and Plan  Retired Outcome Summary/Treatment Plan (OT)  Anticipated Discharge Disposition (OT): home with assist  Therapy Frequency (OT): evaluation only  Plan of Care Review  Plan of Care Reviewed With: patient  Outcome Summary: OT Eval completed on this  date as co-eval with PT. Pt pleasant and agreeable to therapy. Bed Mobility: Independent Transfers: Independent Grooming: Independent LBD: Independent Funct Mob: Independent. Pt demos no needs which required skilled OT intervention at this time. Recommend d/c home with assist.  Plan of Care Reviewed With: patient  Outcome Summary: OT Eval completed on this date as co-eval with PT. Pt pleasant and agreeable to therapy. Bed Mobility: Independent Transfers: Independent Grooming: Independent LBD: Independent Funct Mob: Independent. Pt demos no needs which required skilled OT intervention at this time. Recommend d/c home with assist.     Time Calculation:   Time Calculation- OT     Row Name 08/04/21 1430             Time Calculation-     OT Start Time  1215  -      OT Stop Time  1253  -      OT Time Calculation (min)  38 min  -      OT Received On  08/04/21  -         Untimed Charges    OT Eval/Re-eval Minutes  38  -         Total Minutes    Untimed Charges Total Minutes  38  -       Total Minutes  38  -        User Key  (r) = Recorded By, (t) = Taken By, (c) = Cosigned By    Initials Name Provider Type    Oli Simmons OT Occupational Therapist        Therapy Charges for Today     Code Description Service Date Service Provider Modifiers Qty    [id number]  OT EVAL LOW COMPLEXITY 3 8/4/2021 Oli Diaz OT GO 1               Oli Diaz OT  8/4/2021

## 2021-08-05 VITALS
HEIGHT: 73 IN | HEART RATE: 85 BPM | BODY MASS INDEX: 24.92 KG/M2 | OXYGEN SATURATION: 98 % | TEMPERATURE: 98.3 F | SYSTOLIC BLOOD PRESSURE: 130 MMHG | WEIGHT: 188.05 LBS | RESPIRATION RATE: 20 BRPM | DIASTOLIC BLOOD PRESSURE: 83 MMHG

## 2021-08-05 LAB
ANION GAP SERPL CALCULATED.3IONS-SCNC: 7 MMOL/L (ref 5–15)
BUN SERPL-MCNC: 8 MG/DL (ref 8–23)
BUN/CREAT SERPL: 8.2 (ref 7–25)
CALCIUM SPEC-SCNC: 9.6 MG/DL (ref 8.6–10.5)
CHLORIDE SERPL-SCNC: 105 MMOL/L (ref 98–107)
CO2 SERPL-SCNC: 28 MMOL/L (ref 22–29)
CREAT SERPL-MCNC: 0.97 MG/DL (ref 0.76–1.27)
GFR SERPL CREATININE-BSD FRML MDRD: 78 ML/MIN/1.73
GLUCOSE BLDC GLUCOMTR-MCNC: 99 MG/DL (ref 70–130)
GLUCOSE SERPL-MCNC: 76 MG/DL (ref 65–99)
MAGNESIUM SERPL-MCNC: 2.1 MG/DL (ref 1.6–2.4)
POTASSIUM SERPL-SCNC: 5 MMOL/L (ref 3.5–5.2)
SODIUM SERPL-SCNC: 140 MMOL/L (ref 136–145)

## 2021-08-05 PROCEDURE — 92523 SPEECH SOUND LANG COMPREHEN: CPT | Performed by: SPEECH-LANGUAGE PATHOLOGIST

## 2021-08-05 PROCEDURE — 80048 BASIC METABOLIC PNL TOTAL CA: CPT | Performed by: INTERNAL MEDICINE

## 2021-08-05 PROCEDURE — G0378 HOSPITAL OBSERVATION PER HR: HCPCS

## 2021-08-05 PROCEDURE — 99213 OFFICE O/P EST LOW 20 MIN: CPT | Performed by: NURSE PRACTITIONER

## 2021-08-05 PROCEDURE — 83735 ASSAY OF MAGNESIUM: CPT | Performed by: INTERNAL MEDICINE

## 2021-08-05 PROCEDURE — 96374 THER/PROPH/DIAG INJ IV PUSH: CPT

## 2021-08-05 PROCEDURE — 25010000002 HYDRALAZINE PER 20 MG: Performed by: INTERNAL MEDICINE

## 2021-08-05 RX ORDER — ATORVASTATIN CALCIUM 40 MG/1
40 TABLET, FILM COATED ORAL NIGHTLY
Qty: 30 TABLET | Refills: 2 | Status: SHIPPED | OUTPATIENT
Start: 2021-08-05

## 2021-08-05 RX ADMIN — HYDRALAZINE HYDROCHLORIDE 10 MG: 20 INJECTION INTRAMUSCULAR; INTRAVENOUS at 02:08

## 2021-08-05 RX ADMIN — APIXABAN 5 MG: 5 TABLET, FILM COATED ORAL at 08:15

## 2021-08-05 RX ADMIN — METOPROLOL TARTRATE 25 MG: 25 TABLET, FILM COATED ORAL at 00:01

## 2021-08-05 RX ADMIN — HYDROCODONE BITARTRATE AND ACETAMINOPHEN 1 TABLET: 5; 325 TABLET ORAL at 07:44

## 2021-08-05 RX ADMIN — SODIUM CHLORIDE, PRESERVATIVE FREE 10 ML: 5 INJECTION INTRAVENOUS at 08:16

## 2021-08-05 RX ADMIN — METOPROLOL TARTRATE 25 MG: 25 TABLET, FILM COATED ORAL at 08:14

## 2021-08-05 NOTE — NURSING NOTE
Hospitalist paged d/t persistant asymptomatic hypertension and stable afib with 9 runs of vtach at 2336. Orders discussed and received. Will continue to monitor.

## 2021-08-05 NOTE — PROGRESS NOTES
"Stroke Progress Note       Chief Complaint: right face numbness    Subjective     HPI: Pt is a 65-yr-old right-handed white male with known diagnosis of Afib, not on anticoagulation d/t bruising, and smoker (Smokes one cigar/day)  presented with c/o right face numbness. Stated his right lip and cheek were numb for about 30 minutes, along with some vision changes in his right eye in which he states \"the vision was gone in part of my eye.\" Stated the vision issue also lasted 30 mins. Stated he had some disorientation during this time as well. This morning he remains at baseline. Strengths are equal 5/5, denies numbness or vision issues.       Review of Systems   HENT: Negative.    Eyes: Negative.    Respiratory: Negative.    Cardiovascular:        Afib   Gastrointestinal: Negative.    Genitourinary: Negative.    Musculoskeletal: Negative.    Neurological: Negative.    Hematological: Negative.    Psychiatric/Behavioral: Negative.         Objective      Temp:  [98.1 °F (36.7 °C)-98.3 °F (36.8 °C)] 98.3 °F (36.8 °C)  Heart Rate:  [] 85  Resp:  [16-20] 20  BP: (112-151)/() 112/80    Neurological Exam  Mental Status  Awake, alert and oriented to person, place and time.Alert. Recent and remote memory are intact. Speech is normal. Language is fluent with no aphasia. Attention and concentration are normal. Fund of knowledge is appropriate for level of education.    Cranial Nerves  CN II: Visual acuity is normal. Visual fields full to confrontation.  CN III, IV, VI: Extraocular movements intact bilaterally. Normal lids and orbits bilaterally. Pupils equal round and reactive to light bilaterally.  CN V: Facial sensation is normal.  CN VII: Full and symmetric facial movement.  CN IX, X: Palate elevates symmetrically. Normal gag reflex.  CN XI: Shoulder shrug strength is normal.  CN XII: Tongue midline without atrophy or fasciculations.    Motor  Normal muscle bulk throughout. No fasciculations present. Strength is " 5/5 throughout all four extremities.    Sensory  Sensation is intact to light touch, pinprick, vibration and proprioception in all four extremities.    Reflexes  Not assessed.    Coordination  Finger-to-nose, rapid alternating movements and heel-to-shin normal bilaterally without dysmetria.    Gait  Normal casual, toe, heel and tandem gait.      Physical Exam  Vitals and nursing note reviewed.   Constitutional:       Appearance: Normal appearance.   HENT:      Head: Normocephalic and atraumatic.   Eyes:      General: Lids are normal.      Extraocular Movements: Extraocular movements intact.      Pupils: Pupils are equal, round, and reactive to light.   Cardiovascular:      Rate and Rhythm: Rhythm irregular.   Pulmonary:      Effort: Pulmonary effort is normal.      Breath sounds: Normal breath sounds.   Musculoskeletal:         General: Normal range of motion.   Skin:     General: Skin is warm and dry.   Neurological:      Mental Status: He is alert and oriented to person, place, and time. Mental status is at baseline.      Coordination: Coordination is intact.      Deep Tendon Reflexes: Strength normal.   Psychiatric:         Mood and Affect: Mood normal.         Speech: Speech normal.         Results Review:    I reviewed the patient's new clinical results.    Lab Results (last 24 hours)     Procedure Component Value Units Date/Time    POC Glucose Once [[id number]]  (Normal) Collected: 08/04/21 2220    Specimen: Blood Updated: 08/05/21 0249     Glucose 99 mg/dL      Comment: : [id number] LISS AMANDAMeter ID: [id number]       Basic Metabolic Panel [[id number]]  (Normal) Collected: 08/05/21 0002    Specimen: Blood Updated: 08/05/21 0055     Glucose 76 mg/dL      BUN 8 mg/dL      Creatinine 0.97 mg/dL      Sodium 140 mmol/L      Potassium 5.0 mmol/L      Chloride 105 mmol/L      CO2 28.0 mmol/L      Calcium 9.6 mg/dL      eGFR Non African Amer 78 mL/min/1.73      BUN/Creatinine Ratio 8.2     Anion Gap 7.0  mmol/L     Narrative:      GFR Normal >60  Chronic Kidney Disease <60  Kidney Failure <15      Magnesium [[id number]]  (Normal) Collected: 08/05/21 0002    Specimen: Blood Updated: 08/05/21 0046     Magnesium 2.1 mg/dL         CT Angiogram Neck    Addendum Date: 8/3/2021     ADDENDUM ADDENDUM #1 These findings were communicated to   Dr. Chowdhury on 08/03/2021 7:31 PM. (CST) Electronically signed by:  Jeffrey Nails MD  8/3/2021 11:12 PM CDT Workstation: [id number]     Result Date: 8/3/2021  1.  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 There is collateral supply to the left PCA cortical branches. Follow-up MRI is recommended. 2.  No other abnormality of the CTA brain. 3.  Negative CTA of the neck. Electronically signed by:  Jeffrey Nails MD  8/3/2021 7:40 PM CDT Workstation: [id number]    MRI Brain Without Contrast    Addendum Date: 8/4/2021     ADDENDUM ADDENDUM #1 Addendum: Clinician taking care of the patient was notified of findings by phone at 10:19 AM on 8/4/2021. Electronically signed by:  Jorge Tejada MD  8/4/2021 10:19 AM CDT Workstation: [id number]     Result Date: 8/4/2021  1.  The diffusion weighted series reveals two left medial temporal lobe linear gray matter foci of increased signal which are lower signal on the apparent coefficient map. One of these regions of abnormal signal measures 1.24 x 0.51 cm and the other one measures 1.14 x 0.22 cm. These regions are consistent with small acute infarcts. 2.  Right frontal lobe gray and white matter focus of abnormal signal is described above consistent with sequela from old infarct in this region. 3.  Left inferior medial occipital lobe small focus of abnormal signal is described above consistent with small old infarct in this region. 4.  Bilateral frontal lobe and right parietal lobe subcortical deep white matter small foci of abnormal signal on FLAIR sequence described above consistent with chronic ischemic gliosis secondary to  microvascular disease. 5.  Remainder of MRI brain exam is unremarkable. Electronically signed by:  Jorge Tejada MD  8/4/2021 9:47 AM CDT Workstation: [id number]    XR Chest 1 View    Result Date: 8/3/2021  CONCLUSION: No Acute Disease [id number] Electronically signed by:  Jeffery Dixon MD  8/3/2021 6:24 PM CDT Workstation: [id number]    CT Head Without Contrast Stroke Protocol    Result Date: 8/3/2021  CONCLUSION: No acute process. Cerebral and cerebellar atrophy. 4 cm greatest dimension area of encephalomalacia right frontal lobe, old infarct versus trauma or other insult. [id number] Electronically signed by:  Jeffery Dixon MD  8/3/2021 6:00 PM CDT Workstation: [id number]    CT Angiogram Head w AI Analysis of LVO    Addendum Date: 8/3/2021     ADDENDUM ADDENDUM #1 These findings were communicated to   Dr. Chowdhury on 08/03/2021 7:31 PM. (CST) Electronically signed by:  Jeffrey Nails MD  8/3/2021 11:12 PM CDT Workstation: [id number]     Result Date: 8/3/2021  1.  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 There is collateral supply to the left PCA cortical branches. Follow-up MRI is recommended. 2.  No other abnormality of the CTA brain. 3.  Negative CTA of the neck. Electronically signed by:  Jeffrey aNils MD  8/3/2021 7:40 PM CDT Workstation: [id number]    Results for orders placed during the hospital encounter of 08/03/21    Adult Transthoracic Echo Complete W/ Cont if Necessary Per Protocol (With Agitated Saline)    Interpretation Summary  · Estimated left ventricular EF = 54% Left ventricular ejection fraction appears to be 51 - 55%. Left ventricular systolic function is normal.  · Left ventricular diastolic function is consistent with (grade Ia w/high LAP) impaired relaxation.  · The right ventricular cavity is borderline dilated.  · Left atrial volume is severely increased.  · The right atrial cavity is moderately dilated.  · Saline test results are negative.        Assessment/Plan     Assessment/Plan:  Pt  "is a 65-yr-old right-handed white male with known diagnosis of Afib, not on anticoagulation d/t bruising, and smoker (Smokes one cigar/day)  presented with c/o right face numbness. Stated his right lip and cheek were numb for about 30 minutes, along with some vision changes in his right eye in which he states \"the vision was gone in part of my eye.\" Stated the vision issue also lasted 30 mins. Stated he had some disorientation during this time as well. This morning he remains at baseline. Strengths are equal 5/5, denies numbness or vision issues.   1. Right face numbness- Resolved, CT shows right frontal encephalomalacia, CTA shows left PCA occlusion, MRI shows new left PCA stroke and old ez PCA strokes and old right frontal stroke. All are likely cardioembolic. LDL is 126, A1C is 5.3. continue Eliquis 5 mg BID, and Lipitor 80 mg/day for secondary stroke prevention.  2. Afib- He remains in afib, continue Eliquis 5 mg BID. Instructed on the importance of anticoagulation and med compliance to reduce stroke risk. Pt agreed to the plan.  3. Normal BP goals.  4. Smoker- Instructed on the importance of smoking cessation to reduce stroke risk.  5. Activity- PT/OT can evaluate today.  6. Diet- Heart-healthy.  Case was discussed with pt, Dr. French, Hospitalist team, and nursing. Thank you for the consult. Pt was seen through A/V interface.          Patient Active Problem List   Diagnosis   • Facial numbness           ANTONIO Aguilar  08/05/21  10:23 CDT      "

## 2021-08-05 NOTE — DISCHARGE SUMMARY
[facility] Medicine Services  DISCHARGE SUMMARY       Date of Admission: 8/3/2021  Date of Discharge:  8/5/2021  Primary Care Physician: Refugio Espinoza MD    Presenting Problem/History of Present Illness:  Facial numbness [R20.0]  Impaired mobility and activities of daily living [Z74.09, Z78.9]  Decreased functional mobility and endurance [Z74.09]  Cerebrovascular accident (CVA) due to embolism of left posterior cerebral artery (CMS/HCC) [I63.432]  Atrial fibrillation, unspecified type (CMS/HCC) [I48.91]       Final Discharge Diagnoses:  Active Hospital Problems    Diagnosis    • Facial numbness        Consults:   Consults     Date and Time Order Name Status Description    8/3/2021  8:53 PM Inpatient Neurology Consult Stroke      8/3/2021  8:34 PM Hospitalist (on-call MD unless specified)      8/3/2021  6:27 PM Inpatient Neurology Consult Stroke            Procedures Performed: None.                Pertinent Test Results:   Lab Results (last 7 days)     Procedure Component Value Units Date/Time    POC Glucose Once [[id number]]  (Normal) Collected: 08/04/21 2220    Specimen: Blood Updated: 08/05/21 0249     Glucose 99 mg/dL      Comment: : [phone] LISS AMANDAMeter ID: [id number]       Basic Metabolic Panel [[id number]]  (Normal) Collected: 08/05/21 0002    Specimen: Blood Updated: 08/05/21 0055     Glucose 76 mg/dL      BUN 8 mg/dL      Creatinine 0.97 mg/dL      Sodium 140 mmol/L      Potassium 5.0 mmol/L      Chloride 105 mmol/L      CO2 28.0 mmol/L      Calcium 9.6 mg/dL      eGFR Non African Amer 78 mL/min/1.73      BUN/Creatinine Ratio 8.2     Anion Gap 7.0 mmol/L     Narrative:      GFR Normal >60  Chronic Kidney Disease <60  Kidney Failure <15      Magnesium [[id number]]  (Normal) Collected: 08/05/21 0002    Specimen: Blood Updated: 08/05/21 0046     Magnesium 2.1 mg/dL     POC Glucose Once [[id number]]  (Normal) Collected: 08/03/21 1802    Specimen:  Blood Updated: 08/04/21 1444     Glucose 95 mg/dL      Comment: : [phone] SANIYA HURTADOYMeter ID: [id number]       Hemoglobin A1c [[id number]]  (Normal) Collected: 08/04/21 0704    Specimen: Blood Updated: 08/04/21 0730     Hemoglobin A1C 5.30 %     Narrative:      Hemoglobin A1C Ranges:    Increased Risk for Diabetes  5.7% to 6.4%  Diabetes                     >= 6.5%  Diabetic Goal                < 7.0%    Lipid Panel [[id number]]  (Abnormal) Collected: 08/04/21 0704    Specimen: Blood Updated: 08/04/21 0730     Total Cholesterol 183 mg/dL      Triglycerides 128 mg/dL      HDL Cholesterol 34 mg/dL      LDL Cholesterol  126 mg/dL      VLDL Cholesterol 23 mg/dL      LDL/HDL Ratio 3.63    Narrative:      Cholesterol Reference Ranges  (U.S. Department of Health and Human Services ATP III Classifications)    Desirable          <200 mg/dL  Borderline High    200-239 mg/dL  High Risk          >240 mg/dL      Triglyceride Reference Ranges  (U.S. Department of Health and Human Services ATP III Classifications)    Normal           <150 mg/dL  Borderline High  150-199 mg/dL  High             200-499 mg/dL  Very High        >500 mg/dL    HDL Reference Ranges  (U.S. Department of Health and Human Services ATP III Classifcations)    Low     <40 mg/dl (major risk factor for CHD)  High    >60 mg/dl ('negative' risk factor for CHD)        LDL Reference Ranges  (U.S. Department of Health and Human Services ATP III Classifcations)    Optimal          <100 mg/dL  Near Optimal     100-129 mg/dL  Borderline High  130-159 mg/dL  High             160-189 mg/dL  Very High        >189 mg/dL    COVID-19 and FLU A/B PCR - Swab, Nasopharynx [[id number]]  (Normal) Collected: 08/03/21 1902    Specimen: Swab from Nasopharynx Updated: 08/03/21 1926     COVID19 Not Detected     Influenza A PCR Not Detected     Influenza B PCR Not Detected    Narrative:      Fact sheet for providers: https://www.fda.gov/media/646734/download    Fact sheet  for patients: https://www.[employer].gov/media/948177/download    Test performed by PCR.    [place] Draw [[id number]] Collected: 08/03/21 1803    Specimen: Blood Updated: 08/03/21 1915    Narrative:      The following orders were created for panel order [place] Draw.  Procedure                               Abnormality         Status                     ---------                               -----------         ------                     Green Top (Gel)[[id number]]                                  Final result               Lavender Top[[id number]]                                     Final result               Gold Top - SST[[id number]]                                   Final result                 Please view results for these tests on the individual orders.    Green Top (Gel) [[id number]] Collected: 08/03/21 1803    Specimen: Blood Updated: 08/03/21 1915     Extra Tube Hold for add-ons.     Comment: Auto resulted.       Lavender Top [[id number]] Collected: 08/03/21 1803    Specimen: Blood Updated: 08/03/21 1915     Extra Tube hold for add-on     Comment: Auto resulted       Gold Top - SST [[id number]] Collected: 08/03/21 1803    Specimen: Blood Updated: 08/03/21 1915     Extra Tube Hold for add-ons.     Comment: Auto resulted.       Protime-INR [[id number]]  (Normal) Collected: 08/03/21 1803    Specimen: Blood Updated: 08/03/21 1834     Protime 12.2 Seconds      INR 0.91    Narrative:      Therapeutic range for most indications is 2.0-3.0 INR,  or 2.5-3.5 for mechanical heart valves.    Comprehensive Metabolic Panel [[id number]] Collected: 08/03/21 1803    Specimen: Blood Updated: 08/03/21 1834     Glucose 97 mg/dL      BUN 11 mg/dL      Creatinine 1.12 mg/dL      Sodium 138 mmol/L      Potassium 3.9 mmol/L      Chloride 103 mmol/L      CO2 25.0 mmol/L      Calcium 8.7 mg/dL      Total Protein 6.6 g/dL      Albumin 4.00 g/dL      ALT (SGPT) 12 U/L      AST (SGOT) 11 U/L      Alkaline Phosphatase 85 U/L      Total Bilirubin 0.2  mg/dL      eGFR Non African Amer 66 mL/min/1.73      Globulin 2.6 gm/dL      A/G Ratio 1.5 g/dL      BUN/Creatinine Ratio 9.8     Anion Gap 10.0 mmol/L     Narrative:      GFR Normal >60  Chronic Kidney Disease <60  Kidney Failure <15      CBC & Differential [[id number]]  (Abnormal) Collected: 08/03/21 1803    Specimen: Blood Updated: 08/03/21 1813    Narrative:      The following orders were created for panel order CBC & Differential.  Procedure                               Abnormality         Status                     ---------                               -----------         ------                     CBC Auto Differential[[id number]]        Abnormal            Final result                 Please view results for these tests on the individual orders.    CBC Auto Differential [[id number]]  (Abnormal) Collected: 08/03/21 1803    Specimen: Blood Updated: 08/03/21 1813     WBC 10.54 10*3/mm3      RBC 4.39 10*6/mm3      Hemoglobin 14.1 g/dL      Hematocrit 42.0 %      MCV 95.7 fL      MCH 32.1 pg      MCHC 33.6 g/dL      RDW 13.2 %      RDW-SD 46.8 fl      MPV 10.0 fL      Platelets 244 10*3/mm3      Neutrophil % 76.6 %      Lymphocyte % 14.7 %      Monocyte % 6.0 %      Eosinophil % 1.9 %      Basophil % 0.4 %      Immature Grans % 0.4 %      Neutrophils, Absolute 8.08 10*3/mm3      Lymphocytes, Absolute 1.55 10*3/mm3      Monocytes, Absolute 0.63 10*3/mm3      Eosinophils, Absolute 0.20 10*3/mm3      Basophils, Absolute 0.04 10*3/mm3      Immature Grans, Absolute 0.04 10*3/mm3      nRBC 0.0 /100 WBC         Imaging Results (Last 7 Days)     Procedure Component Value Units Date/Time    MRI Brain Without Contrast [[id number]] Collected: 08/04/21 0843     Updated: 08/04/21 1020    Addenda:         ADDENDUM   ADDENDUM #1       Addendum: Clinician taking care of the patient was notified of  findings by phone at 10:19 AM on 8/4/2021.    Electronically signed by:  Jorge Tejada MD  8/4/2021 10:19 AM CDT  Workstation:  [id number]    Signed: 08/04/21 1019 by Dick Tejada MD    Narrative:      Procedure: MRI brain without contrast    Reason for exam: Neuro deficit acute    FINDINGS: Multisequence multiplanar MR imaging of the brain was  performed without contrast. The diffusion weighted series reveals  two left medial temporal lobe linear gray matter foci of  increased signal which are lower signal on the apparent  coefficient map. One of these regions of abnormal signal measures  1.24 x 0.51 cm and the other one measures 1.14 x 0.22 cm. These  regions are consistent with small acute infarcts. The diffusion  weighted series is otherwise unremarkable. Right frontal lobe  gray and white matter focus of abnormal low signal on T1  weighting which is higher signal on FLAIR and T2 weighting. Left  inferior medial occipital lobe small linear foci of gray matter  increased signal on FLAIR which is lower signal on T1 weighting.  Bilateral frontal lobe and right parietal lobe subcortical deep  white matter very small foci of circular and oval increased  signal on FLAIR sequence. Cerebral and cerebellar parenchymal are  otherwise normal. Ventricular system and subarachnoid spaces are  normal.      Impression:      1.  The diffusion weighted series reveals two left medial  temporal lobe linear gray matter foci of increased signal which  are lower signal on the apparent coefficient map. One of these  regions of abnormal signal measures 1.24 x 0.51 cm and the other  one measures 1.14 x 0.22 cm. These regions are consistent with  small acute infarcts.   2.  Right frontal lobe gray and white matter focus of abnormal  signal is described above consistent with sequela from old  infarct in this region.  3.  Left inferior medial occipital lobe small focus of abnormal  signal is described above consistent with small old infarct in  this region.  4.  Bilateral frontal lobe and right parietal lobe subcortical  deep white matter small foci of  abnormal signal on FLAIR sequence  described above consistent with chronic ischemic gliosis  secondary to microvascular disease.  5.  Remainder of MRI brain exam is unremarkable.    Electronically signed by:  Jorge Tejada MD  8/4/2021 9:47 AM CDT  Workstation: [id number]    CT Angiogram Head w AI Analysis of LVO [[id number]] Collected: 08/03/21 1832     Updated: 08/03/21 2313    Addenda:         ADDENDUM   ADDENDUM #1       These findings were communicated to   Dr. Chowdhury on 08/03/2021  7:31 PM. (CST)    Electronically signed by:  Jeffrey Nails MD  8/3/2021 11:12  PM CDT Workstation: [id number]    Signed: 08/03/21 2312 by Jeffrey Nails MD    Narrative:      EXAM DESCRIPTION:  CT ANGIOGRAM HEAD W AI ANALYSIS OF LVO (accession [id number]),  CT ANGIOGRAM NECK (accession [id number])     CLINICAL HISTORY:  65 years Male, weakness, vision changes    TECHNIQUE: Helical CT axial images are obtained from the base  thoracic inlet through the vertex with IV contrast. Multiplanar  reconstruction.  3D image processing was also performed. NASCET  criteria using the distal ICAs for comparison were used for  evaluation of carotid stenosis. This exam was performed according  to our departmental dose-optimization program, which includes  automated exposure control, adjustment of the mA and/or kV  according to patient size and/or use of iterative reconstruction  technique.    COMPARISON: Noncontrast CT brain 8/3/2021 performed earlier same  day.      FINDINGS:  BRAIN:  Distal aspect of bilateral internal carotid arteries are normal  in caliber without focal stenosis or aneurysm. Mild  atherosclerotic calcification of bilateral cavernous ICAs without  significant narrowing.  There is mild to moderate hypoplasia of  the right A1 segment, normal variant. Right A2 and distal  branches are normal in caliber. Patent anterior communicating  artery. Left anterior cerebral artery and bilateral middle  cerebral arteries are within  normal limits. .     Basilar artery is normal in caliber and morphology  without  high-grade stenosis or basilar tip aneurysm. Bilateral PCAs are  identified emanating from the basilar tip. There is occlusion of  the P3 branch of the left PCA. There is evidence of collateral  supply to the cortical branches of the left PCA. Right PCA is  within normal limits. Patent right PCOM. Non-visualized left  PCOM.    No aneurysm, arteriovenous malformation or other vascular  anomalies.      NECK:  RIGHT CAROTID: Normal appearing carotid bifurcation.  Cervical  course of the internal carotid artery is within normal limits  without focal stenosis, aneurysm, or dissection. Normal CCA.  Origin and proximal visualized branches of the external carotid  artery appears normal.     LEFT CAROTID: Normal appearing carotid bifurcation.  Cervical  course of the internal carotid artery is within normal limits  without focal stenosis, aneurysm, or dissection. Normal CCA.  Origin and proximal visualized branches of the external carotid  artery appears normal.     VERTEBRAL: Bilateral vertebral arteries have a normal appearance  with left dominance.    OTHER:  Origin of the great vessels are within normal limits.    SOFT TISSUES:  Unremarkable.          Impression:      1.  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 There is  collateral supply to the left PCA cortical branches. Follow-up  MRI is recommended.  2.  No other abnormality of the CTA brain.  3.  Negative CTA of the neck.    Electronically signed by:  Jeffrey Nails MD  8/3/2021 7:40 PM  CDT Workstation: [id number]    CT Angiogram Neck [[id number]] Collected: 08/03/21 1832     Updated: 08/03/21 2313    Addenda:         ADDENDUM   ADDENDUM #1       These findings were communicated to   Dr. Chowdhury on 08/03/2021  7:31 PM. (CST)    Electronically signed by:  Jeffrey Nails MD  8/3/2021 11:12  PM CDT Workstation: [id number]    Signed: 08/03/21 2312 by Jeffrey Nails  MD    Narrative:      EXAM DESCRIPTION:  CT ANGIOGRAM HEAD W AI ANALYSIS OF LVO (accession [id number]),  CT ANGIOGRAM NECK (accession [id number])     CLINICAL HISTORY:  65 years Male, weakness, vision changes    TECHNIQUE: Helical CT axial images are obtained from the base  thoracic inlet through the vertex with IV contrast. Multiplanar  reconstruction.  3D image processing was also performed. NASCET  criteria using the distal ICAs for comparison were used for  evaluation of carotid stenosis. This exam was performed according  to our departmental dose-optimization program, which includes  automated exposure control, adjustment of the mA and/or kV  according to patient size and/or use of iterative reconstruction  technique.    COMPARISON: Noncontrast CT brain 8/3/2021 performed earlier same  day.      FINDINGS:  BRAIN:  Distal aspect of bilateral internal carotid arteries are normal  in caliber without focal stenosis or aneurysm. Mild  atherosclerotic calcification of bilateral cavernous ICAs without  significant narrowing.  There is mild to moderate hypoplasia of  the right A1 segment, normal variant. Right A2 and distal  branches are normal in caliber. Patent anterior communicating  artery. Left anterior cerebral artery and bilateral middle  cerebral arteries are within normal limits. .     Basilar artery is normal in caliber and morphology  without  high-grade stenosis or basilar tip aneurysm. Bilateral PCAs are  identified emanating from the basilar tip. There is occlusion of  the P3 branch of the left PCA. There is evidence of collateral  supply to the cortical branches of the left PCA. Right PCA is  within normal limits. Patent right PCOM. Non-visualized left  PCOM.    No aneurysm, arteriovenous malformation or other vascular  anomalies.      NECK:  RIGHT CAROTID: Normal appearing carotid bifurcation.  Cervical  course of the internal carotid artery is within normal limits  without focal stenosis, aneurysm,  or dissection. Normal CCA.  Origin and proximal visualized branches of the external carotid  artery appears normal.     LEFT CAROTID: Normal appearing carotid bifurcation.  Cervical  course of the internal carotid artery is within normal limits  without focal stenosis, aneurysm, or dissection. Normal CCA.  Origin and proximal visualized branches of the external carotid  artery appears normal.     VERTEBRAL: Bilateral vertebral arteries have a normal appearance  with left dominance.    OTHER:  Origin of the great vessels are within normal limits.    SOFT TISSUES:  Unremarkable.          Impression:      1.  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 There is  collateral supply to the left PCA cortical branches. Follow-up  MRI is recommended.  2.  No other abnormality of the CTA brain.  3.  Negative CTA of the neck.    Electronically signed by:  Jeffrey Nails MD  8/3/2021 7:40 PM  CDT Workstation: [id number]    CT Head Without Contrast Stroke Protocol [[id number]] Collected: 08/03/21 1756     Updated: 08/03/21 1827    Narrative:        CT Head Without Contrast    History: Stroke    Axial scans of the brain were obtained without intravenous  contrast.  Coronal and sagital reconstructions were preformed.    This exam was performed according to our departmental  dose-optimization program, which includes automated exposure  control, adjustment of the mA and/or kV according to patient size  and/or use of iterative reconstruction technique.    DLP: 959.50    Comparison: None    Findings:  Patient is edentulous.  The visualized paranasal sinuses are unremarkable.    No acute process.  Cerebral and cerebellar atrophy.  4 cm greatest dimension area of encephalomalacia right frontal  lobe, old infarct versus trauma or other insult.  Incidental giant cisterna magna, a normal variant.  No hemorrhage.  No mass.  No abnormal areas of increased attenuation.  No midline shift.  No abnormal extra-axial fluid  collections.      Impression:      CONCLUSION:  No acute process.  Cerebral and cerebellar atrophy.  4 cm greatest dimension area of encephalomalacia right frontal  lobe, old infarct versus trauma or other insult.    [id number]    Electronically signed by:  Jeffery Dixon MD  8/3/2021 6:00 PM CDT  Workstation: [id number]    XR Chest 1 View [[id number]] Collected: 08/03/21 1800     Updated: 08/03/21 1825    Narrative:        PORTABLE CHEST    HISTORY: Stroke protocol onset greater than 12 hours    Portable AP upright film of the chest was obtained at 5:56 PM.  COMPARISON: None    FINDINGS:   EKG leads.  The lungs are clear of an acute process.  The heart is not enlarged.  The pulmonary vasculature is not increased.  No pleural effusion.  No pneumothorax.  No acute osseous abnormality.  Degenerative changes are present in the thoracic spine.      Impression:      CONCLUSION:  No Acute Disease    [id number]    Electronically signed by:  Jeffery Dixon MD  8/3/2021 6:24 PM CDT  Workstation: [id number]            Chief Complaint on Day of Discharge: None.    Hospital Course:  The patient patient was admitted for CVA (left posterior artery CVA) and started on statin, aspirin, PT and OT.  He was also noted to have paroxysmal atrial fibrillation and started on  blockers and Eliquis.  Aspirin was not recommended by neurologist because patient's vessels were mostly patent.  CT head showed right frontal hypodensity which is chronic, no hemorrhage.  CT angiogram of head and neck shows no significant stenosis, but distal left PCA occlusion.  MRI brain shows acute small left PCA stroke in the occipital lobe and thalamus, no hemorrhage.  Also noted chronic right MCA strokes, and an old left PCA stroke.  Echocardiogram showed an estimated ejection fraction of 51 to 55% with normal left ventricular systolic function/increased left atrial volume.  He became asymptomatic and was cleared for discharge by the neurologist and to follow-up with primary care  "provider as outpatient.      Condition on Discharge: Stable and improved.    Physical Exam on Discharge:  /80   Pulse 85   Temp 98.3 °F (36.8 °C) (Temporal)   Resp 20   Ht 185.4 cm (73\")   Wt 85.3 kg (188 lb 0.8 oz)   SpO2 97%   BMI 24.81 kg/m²   Physical Exam  Vitals and nursing note reviewed.   Constitutional:       General: He is not in acute distress.     Appearance: He is well-developed. He is not diaphoretic.   HENT:      Head: Normocephalic and atraumatic.   Eyes:      General: No scleral icterus.     Extraocular Movements: Extraocular movements intact.      Pupils: Pupils are equal, round, and reactive to light.   Neck:      Thyroid: No thyromegaly.      Vascular: No JVD.   Cardiovascular:      Rate and Rhythm: Normal rate and regular rhythm.      Heart sounds: Normal heart sounds. No murmur heard.   No friction rub. No gallop.    Pulmonary:      Effort: Pulmonary effort is normal.      Breath sounds: Normal breath sounds. No wheezing or rales.   Chest:      Chest wall: No tenderness.   Abdominal:      General: Bowel sounds are normal. There is no distension.      Palpations: Abdomen is soft. There is no mass.      Tenderness: There is no abdominal tenderness. There is no right CVA tenderness, left CVA tenderness, guarding or rebound.   Musculoskeletal:         General: No swelling, tenderness or deformity.      Cervical back: Normal range of motion and neck supple.      Right lower leg: No edema.      Left lower leg: No edema.   Skin:     General: Skin is warm and dry.      Coloration: Skin is not jaundiced or pale.      Findings: No bruising, erythema, lesion or rash.   Neurological:      General: No focal deficit present.      Mental Status: He is alert and oriented to person, place, and time. Mental status is at baseline.      Cranial Nerves: No cranial nerve deficit.      Sensory: No sensory deficit.      Motor: No weakness or abnormal muscle tone.      Coordination: Coordination normal.    "   Gait: Gait normal.      Deep Tendon Reflexes: Reflexes normal.   Psychiatric:         Mood and Affect: Mood normal.         Behavior: Behavior normal.         Thought Content: Thought content normal.         Judgment: Judgment normal.           Discharge Disposition:  Home or Self Care    Discharge Medications:     Discharge Medications      New Medications      Instructions Start Date   apixaban 5 MG tablet tablet  Commonly known as: ELIQUIS   5 mg, Oral, Every 12 Hours Scheduled      atorvastatin 40 MG tablet  Commonly known as: LIPITOR   40 mg, Oral, Nightly      metoprolol tartrate 25 MG tablet  Commonly known as: LOPRESSOR   25 mg, Oral, Every 12 Hours Scheduled         Continue These Medications      Instructions Start Date   ergocalciferol 1.25 MG (37057 UT) capsule  Commonly known as: ERGOCALCIFEROL   50,000 Units, Oral      HYDROcodone-acetaminophen 7.5-325 MG per tablet  Commonly known as: NORCO   1 tablet, Oral, Every 6 Hours PRN             Discharge Diet: Heart healthy.     Activity at Discharge: As tolerated.     Discharge Care Plan/Instructions: Patient has been advised to take his medications as prescribed and to return to the emergency room in the event of any worsening symptoms.    Follow-up Appointments:   Future Appointments   Date Time Provider Department Center   9/3/2021  9:00 AM Ehsan Costello APRN [facility] None       Test Results Pending at Discharge:       Modesto Young MD  08/05/21  10:10 CDT    Time: 35 minutes.

## 2021-08-05 NOTE — PLAN OF CARE
Goal Outcome Evaluation:  Plan of Care Reviewed With: patient        Progress: improving  Outcome Summary: ST evaluation completed.  pt c/o head ache and isnot willing to partipate in formal cog testing.  he is pleasant and eating am meal with no s/s of difficiulty.  he states his right facial numbness and vision deficits have returned to normal.  he has no residual problems .  no treatment indicated at this time.

## 2021-08-05 NOTE — DISCHARGE INSTR - OTHER ORDERS
Please take $10 Eliquis co-pay card to [facility].   First month eliquis will be $0- provided free 30 day trial offer to pharmacy.  
Home

## 2021-08-05 NOTE — PLAN OF CARE
Goal Outcome Evaluation:      Patient new admission to CCU. Settled him into his room and answered any questions. He has concerns about his stay and verbalized wanting to leave tomorrow. Will follow up. Otherwise, rested comfortably. Asymptomatic afib. Discussed POC with patient. Continuing to monitor.

## 2021-08-05 NOTE — THERAPY EVALUATION
Acute Care - Speech Language Pathology Initial Evaluation  [facility]     Patient Name: Filiberto Lynch  : 1955  MRN: [id number]  Today's Date: 2021               Admit Date: 8/3/2021     Visit Dx:    ICD-10-CM ICD-9-CM   1. Facial numbness  R20.0 782.0   2. Cerebrovascular accident (CVA) due to embolism of left posterior cerebral artery (CMS/HCC)  I63.432 434.11   3. Atrial fibrillation, unspecified type (CMS/HCC)  I48.91 427.31   4. Decreased functional mobility and endurance  Z74.09 780.99   5. Impaired mobility and activities of daily living  Z74.09 V49.89    Z78.9      Patient Active Problem List   Diagnosis   • Facial numbness     Past Medical History:   Diagnosis Date   • Hypertension      History reviewed. No pertinent surgical history.    SLP Recommendation and Plan  SLP Diagnosis: cva        [facility] [place] Criteria for Skilled Therapy Interventions Met: not appropriate  Anticipated Discharge Disposition (SLP): home              Plan for Continued Treatment (SLP): no treatment       Plan of Care Reviewed With: patient  Progress: improving  Outcome Summary: ST evaluation completed.  pt c/o head ache and isnot willing to partipate in formal cog testing.  he is pleasant and eating am meal with no s/s of difficiulty.  he states his right facial numbness and vision deficits have returned to normal.  he has no residual problems .  no treatment indicated at this time.         SLP EVALUATION (last 72 hours)      SLP SLC Evaluation     Row Name 21 0810                   Communication Assessment/Intervention    Document Type  discharge evaluation/summary  -EC        Subjective Information  no complaints  -EC        Patient Observations  alert;cooperative;agree to therapy  -EC        Patient/Family/Caregiver Comments/Observations  none   -EC        Care Plan Review  evaluation/treatment results reviewed;care plan/treatment goals reviewed;risks/benefits reviewed;current/potential barriers  reviewed;patient/other agree to care plan  -EC        Patient Effort  excellent  -EC           General Information    Patient Profile Reviewed  yes  -EC        Pertinent History Of Current Problem  c/o head ache now and vision dif yesterday that have resolved.    -EC        Precautions/Limitations, Vision  WFL with corrective lenses  -EC        Precautions/Limitations, Hearing  WFL  -EC        Patient Level of Education  12 th grade  -EC        Prior Level of Function-Communication  WFL  -EC           Pain Scale: Numbers Pre/Post-Treatment    Pretreatment Pain Rating  9/10  -EC        Posttreatment Pain Rating  9/10  -EC           Oral Motor Structure and Function    Oral Motor Structure and Function  WFL  -EC        Dentition Assessment  upper dentures/partial in place;lower dentures/partial in place  -EC        Mucosal Quality  moist, healthy  -EC           Motor Speech Assessment/Intervention    Motor Speech Function  WFL  -EC           Cognitive Assessment Intervention- SLP    Cognitive Function (Cognition)  WFL  -EC        Orientation Status (Cognition)  WFL  -EC        Memory (Cognitive)  WFL  -EC        Attention (Cognitive)  WFL  -EC        Thought Organization (Cognitive)  WFL  -EC        Cognition, Comment  pt states he has returned to normal except a headache  -EC           SLP Clinical Impressions    Barriers to Overall Progress (SLP)  none  -EC        SLP Diagnosis  cva  -EC        Rehab Potential/Prognosis  excellent  -EC        SLC Criteria for Skilled Therapy Interventions Met  not appropriate  -EC        Functional Impact  no impact on function  -EC        Plan for Continued Treatment (SLP)  no treatment  -EC           Recommendations    Therapy Frequency (SLP SLC)  evaluation only  -EC        Anticipated Discharge Disposition (SLP)  home  -EC          User Key  (r) = Recorded By, (t) = Taken By, (c) = Cosigned By    Initials Name Effective Dates    EC Lina Esposito CCC-SLP 06/16/21 -               EDUCATION  The patient has been educated in the following areas:     Cognitive Impairment Communication Impairment Dysphagia (Swallowing Impairment).                      Time Calculation:     Time Calculation- SLP     Row Name 08/05/21 0841             Time Calculation- SLP    SLP Start Time  0810  -EC      SLP Stop Time  0845  -EC      SLP Time Calculation (min)  35 min  -EC      Total Timed Code Minutes- SLP  35 minute(s)  -EC      SLP Received On  08/05/21  -EC         Untimed Charges    SLP Eval/Re-eval   ST Eval Speech and Production w/ Language - 42270  -EC      [id number] Eval Speech and Production w/ Language Minutes  35  -EC         Total Minutes    Untimed Charges Total Minutes  35  -EC       Total Minutes  35  -EC        User Key  (r) = Recorded By, (t) = Taken By, (c) = Cosigned By    Initials Name Provider Type    EC Lina Esposito CCC-SLP Speech and Language Pathologist          Therapy Charges for Today     Code Description Service Date Service Provider Modifiers Qty    02758640871 HC ST EVAL SPEECH AND PROD W LANG  2 8/5/2021 Lina Esposito CCC-SLP GN 1                     HESHAM Majano  8/5/2021

## 2021-08-30 LAB
QT INTERVAL: 398 MS
QTC INTERVAL: 459 MS

## 2022-03-31 ENCOUNTER — TRANSCRIBE ORDERS (OUTPATIENT)
Dept: OCCUPATIONAL THERAPY | Facility: HOSPITAL | Age: 67
End: 2022-03-31

## 2022-03-31 ENCOUNTER — TRANSCRIBE ORDERS (OUTPATIENT)
Dept: PHYSICAL THERAPY | Facility: HOSPITAL | Age: 67
End: 2022-03-31

## 2022-03-31 DIAGNOSIS — I63.439: Primary | ICD-10-CM

## 2022-04-28 ENCOUNTER — TRANSCRIBE ORDERS (OUTPATIENT)
Dept: PHYSICAL THERAPY | Facility: HOSPITAL | Age: 67
End: 2022-04-28

## 2022-04-28 ENCOUNTER — TRANSCRIBE ORDERS (OUTPATIENT)
Dept: OCCUPATIONAL THERAPY | Facility: HOSPITAL | Age: 67
End: 2022-04-28

## 2022-04-28 ENCOUNTER — TRANSCRIBE ORDERS (OUTPATIENT)
Dept: SPEECH THERAPY | Facility: HOSPITAL | Age: 67
End: 2022-04-28

## 2022-04-28 DIAGNOSIS — I63.432: Primary | ICD-10-CM

## 2022-04-28 DIAGNOSIS — I63.449 CEREBROVASCULAR ACCIDENT (CVA) DUE TO EMBOLIC OCCLUSION OF CEREBELLAR ARTERY: Primary | ICD-10-CM

## 2022-05-04 ENCOUNTER — HOSPITAL ENCOUNTER (OUTPATIENT)
Dept: PHYSICAL THERAPY | Facility: HOSPITAL | Age: 67
Setting detail: THERAPIES SERIES
Discharge: HOME | End: 2022-05-04

## 2022-05-04 ENCOUNTER — HOSPITAL ENCOUNTER (OUTPATIENT)
Dept: SPEECH THERAPY | Facility: HOSPITAL | Age: 67
Setting detail: THERAPIES SERIES
Discharge: HOME | End: 2022-05-04

## 2022-05-04 ENCOUNTER — HOSPITAL ENCOUNTER (OUTPATIENT)
Dept: OCCUPATIONAL THERAPY | Facility: HOSPITAL | Age: 67
Setting detail: THERAPIES SERIES
Discharge: HOME | End: 2022-05-04

## 2022-05-04 DIAGNOSIS — I63.439 CEREBROVASCULAR ACCIDENT (CVA) DUE TO EMBOLISM OF POSTERIOR CEREBRAL ARTERY, UNSPECIFIED BLOOD VESSEL LATERALITY: Primary | ICD-10-CM

## 2022-05-04 DIAGNOSIS — Z78.9 IMPAIRED MOBILITY AND ADLS: ICD-10-CM

## 2022-05-04 DIAGNOSIS — I63.532 CEREBROVASCULAR ACCIDENT (CVA) DUE TO OCCLUSION OF LEFT POSTERIOR CEREBRAL ARTERY: Primary | ICD-10-CM

## 2022-05-04 DIAGNOSIS — I63.432: ICD-10-CM

## 2022-05-04 DIAGNOSIS — Z74.09 IMPAIRED MOBILITY AND ADLS: ICD-10-CM

## 2022-05-04 DIAGNOSIS — Z78.9 IMPAIRED INSTRUMENTAL ACTIVITIES OF DAILY LIVING (IADL): ICD-10-CM

## 2022-05-04 DIAGNOSIS — R27.8 IMPAIRED COORDINATION OF UPPER EXTREMITY: ICD-10-CM

## 2022-05-04 PROCEDURE — 92523 SPEECH SOUND LANG COMPREHEN: CPT | Performed by: SPEECH-LANGUAGE PATHOLOGIST

## 2022-05-04 PROCEDURE — 97166 OT EVAL MOD COMPLEX 45 MIN: CPT

## 2022-05-04 PROCEDURE — 97161 PT EVAL LOW COMPLEX 20 MIN: CPT | Performed by: PHYSICAL THERAPIST

## 2022-05-04 NOTE — THERAPY EVALUATION
Outpatient Speech Language Pathology   Adult Speech Language Cognitive Initial Evaluation  [facility]     Patient Name: Filiberto Lynch  : 1955  MRN: [id number]  Today's Date: 2022        Visit Date: 2022   Patient Active Problem List   Diagnosis   • Facial numbness        Past Medical History:   Diagnosis Date   • Hypertension         Past Surgical History:   Procedure Laterality Date   • KNEE SURGERY Right     ORIF          Visit Dx:    ICD-10-CM ICD-9-CM   1. Cerebrovascular accident (CVA) due to embolic occlusion of left posterior cerebral artery (HCC)  I63.432 434.11            OP SLP Assessment/Plan - 22 0935        SLP Assessment    Functional Problems Speech Language- Adult/Cognition  -EK    Impact on Function: Adult Speech Language/Cognition Unable to complete specified job requirements  -EK    Clinical Impression: Speech Language-Adult/Congnition Minimal:;Mild:;Cognitive Communication Impairment  -EK    Functional Problems Comment Pt concerned about processing and memory for returning to job using computer. Pt concerned his fatigue will be a barrier to job performance.  -EK    Clinical Impression Comments Pt performed well on SLUMS by scoring 28/30 which WNL. Pt is concerned that he will not be able to work as quickly as he did prior. He is concerned about the process of his job, demand, stress, and uncertainity of remembering the steps to computer work.  -EK    SLP Diagnosis symbolic dysfunction  -EK    Prognosis Good (comment)  -EK       SLP Plan    Frequency 1 time per week  -EK    Duration 4 weeks  -EK    Plan Comments Initiate plan of care  -EK          User Key  (r) = Recorded By, (t) = Taken By, (c) = Cosigned By    Initials Name Provider Type    Lina Mcwilliams CCC-SLP Speech and Language Pathologist                 SLP SLC Evaluation - 22 0935        Communication Assessment/Intervention    Subjective Information no complaints  -EK    Patient  Observations alert;cooperative;agree to therapy  -EK    Patient Effort good  -EK       General Information    Patient Profile Reviewed yes  -EK    Pertinent History Of Current Problem Pt reports CVA ((left posterior cerebral artery) end of March. Pt remained in hospital for two days and return home. Pt has not received any therapy until this point.  -EK    Precautions/Limitations, Vision WFL with corrective lenses  -EK    Precautions/Limitations, Hearing WFL  -EK    Plans/Goals Discussed with patient  -EK       Pain    Additional Documentation Pain Scale: Numbers Pre/Post-Treatment (Group)  -EK       Pain Scale: Numbers Pre/Post-Treatment    Pretreatment Pain Rating 0/10 - no pain  -EK    Posttreatment Pain Rating 0/10 - no pain  -EK       Comprehension Assessment/Intervention    Comprehension Assessment/Intervention Auditory Comprehension  -EK       Auditory Comprehension Assessment/Intervention    Auditory Comprehension (Communication) WFL  -EK    Able to Identify Objects/Pictures (Communication) WFL  -EK    Answers Questions (Communication) WFL  -EK    Able to Follow Commands (Communication) WFL  -EK    Narrative Discourse WFL  -EK       Expression Assessment/Intervention    Expression Assessment/Intervention verbal expression  -EK       Verbal Expression Assessment/Intervention    Verbal Expression WFL  -EK    Automatic Speech (Communication) WFL  -EK    Repetition WFL  -EK    Confrontational Naming WFL  -EK    Spontaneous/Functional Words WFL  -EK    Sentence Formulation WFL  -EK    Conversational Discourse/Fluency WFL  -EK       Oral Motor Structure and Function    Oral Motor Structure and Function WFL  -EK       Motor Speech Assessment/Intervention    Motor Speech Function WFL  -EK       Cursory Voice Assessment/Intervention    Quality and Resonance (Voice) WFL  -EK       Cognitive Assessment Intervention- SLP    Cognitive Function (Cognition) WFL  -EK    Orientation Status (Cognition) WFL  -EK    Memory  (Cognitive) mild impairment  -EK    Attention (Cognitive) WFL  -EK    Thought Organization (Cognitive) mild impairment  -EK    Reasoning (Cognitive) WFL  -EK    Problem Solving (Cognitive) WFL  -EK    Functional Math (Cognitive) WFL  -EK    Executive Function (Cognition) WFL  -EK    Pragmatics (Communication) WFL  -EK       SLP Evaluation Clinical Impressions    SLP Diagnosis mild cognitive deficits  -EK    Rehab Potential/Prognosis good  -EK    SLC Criteria for Skilled Therapy Interventions Met yes  -EK    Functional Impact difficulty completing vocational tasks  -EK       SLP Treatment Clinical Impressions    Care Plan Review evaluation/treatment results reviewed  -EK       Recommendations    Therapy Frequency (SLP [facility]) 1 day per week  -EK    Predicted Duration Therapy Intervention (Days) 4 weeks  -EK       Cognitive Linguistic Treatment Objectives    Organizational Skills Selection organizational skills, SLP goal 1  -EK       Memory Skills Goal 1 (SLP)    Improve Memory Skills Through Goal 1 (SLP) recalling unrelated word lists immediately;100%  -EK    Time Frame (Memory Skills Goal 1, SLP) by discharge  -EK    Progress/Outcomes (Memory Skills Goal 1, SLP) other (see comments)   new goal -EK       Memory Skills Goal 2 (SLP)    Improve Memory Skills Through Goal 2 (SLP) listen to a paragraph and answer questions;100%;independently (over 90% accuracy)  -EK    Time Frame (Memory Skills Goal 2, SLP) by discharge  -EK    Progress/Outcomes (Memory Skills Goal 2, SLP) other (see comments)   new goal -EK       Organizational Skills Goal 1 (SLP)    Improve Thought Organization Through Goal 1 (SLP) completing mental manipulation task;100%;independently (over 90% accuracy)  -EK    Time Frame (Thought Organization Skills Goal 1, SLP) by discharge  -EK    Progress/Outcomes (Thought Organization Skills Goal 1, SLP) other (see comments)   new goal -EK          User Key  (r) = Recorded By, (t) = Taken By, (c) = Cosigned By     Initials Name Provider Type    Lina Mcwilliams CCC-SLP Speech and Language Pathologist               SLP Adult Swallow Evaluation     Row Name 05/04/22 0935       Rehab Evaluation    Document Type evaluation  -EK       Oral Motor Structure and Function    Dentition Assessment natural, present and adequate  -EK          User Key  (r) = Recorded By, (t) = Taken By, (c) = Cosigned By    Initials Name Provider Type    Lina Mcwilliams CCC-SLP Speech and Language Pathologist                              OP SLP Education     Row Name 05/04/22 0935       Education    Barriers to Learning No barriers identified  -EK    Education Provided Described results of evaluation  -EK    Assessed Learning motivation  -EK    Learning Motivation Strong  -EK    Learning Method Demonstration;Explanation  -EK    Teaching Response Verbalized understanding;Demonstrated understanding  -EK    Education Comments SLP discussed minimal deficits noted based on pt report and concern for returning to occupational duties. SLP discussed one time per week for return to baseline independence and prepared for return to work.  -EK          User Key  (r) = Recorded By, (t) = Taken By, (c) = Cosigned By    Initials Name Effective Dates    Lina Mcwilliams CCC-ESTELA 06/16/21 -                SLP OP Goals     Row Name 05/04/22 1322          SLP Time Calculation    SLP Goal Re-Cert Due Date 06/02/22  -EK           User Key  (r) = Recorded By, (t) = Taken By, (c) = Cosigned By    Initials Name Provider Type    Lina Mcwilliams CCC-SLP Speech and Language Pathologist                         Time Calculation:   SLP Start Time: 0935  SLP Stop Time: 1015  SLP Time Calculation (min): 40 min  Total Timed Code Minutes- SLP: 40 minute(s)    Therapy Charges for Today     Code Description Service Date Service Provider Modifiers Qty    04048158326 HC ST EVAL SPEECH AND PROD W LANG  3 5/4/2022 Wilver  Lina Santizo, GAUTAM-SLP GN 1        SLP student performed session with direct supervision from SLP.            Lina Pettit CCC-SLP  5/4/2022

## 2022-05-04 NOTE — THERAPY DISCHARGE NOTE
Outpatient Physical Therapy Ortho Initial Evaluation/Discharge  [facility]     Patient Name: Filiberto Lynch  : 1955  MRN: [id number]  Today's Date: 2022      Visit Date: 2022    Patient Active Problem List   Diagnosis   • Facial numbness        Past Medical History:   Diagnosis Date   • Hypertension         Past Surgical History:   Procedure Laterality Date   • KNEE SURGERY Right     ORIF          Visit Dx:     ICD-10-CM ICD-9-CM   1. Cerebrovascular accident (CVA) due to embolism of posterior cerebral artery, unspecified blood vessel laterality (HCC)  I63.439 434.11        Patient History     Row Name 22 0854             History    Date Current Problem(s) Began 22  -BB      Brief Description of Current Complaint Present today when woke up and noticed couldnt hold anything and when trying to walk bumped into walls. Notes getting up to get ready and shower and fell shower. Reports noticing speech deficits on top of the walking deficits. Reports family took him to the hospital. Stayed in hospital 2 days. No home health. Went to [facility].  -BB      Previous treatment for THIS PROBLEM Rehabilitation  -BB      Patient/Caregiver Goals Return to prior level of function;Improve strength  -BB      Patient's Rating of General Health Good  -BB      Hand Dominance right-handed  -BB      Occupation/sports/leisure activities ride motorcycles, works on cars  -BB      What clinical tests have you had for this problem? MRI;CT scan  -BB      Results of Clinical Tests LPCA  -BB              Pain     Pain at Present 0  -BB              Fall Risk Assessment    Any falls in the past year: Yes  -BB      Number of falls reported in the last 12 months 2-3 the day had a stroke  -BB      Factors that contributed to the fall: Lost balance  -BB            User Key  (r) = Recorded By, (t) = Taken By, (c) = Cosigned By    Initials Name Provider Type    Aubree Wheeler PT DPT Physical  "Therapist                 PT Ortho     Row Name 05/04/22 0896       Subjective Comments    Subjective Comments see pt hx  -BB       Precautions and Contraindications    Precautions/Limitations fall precautions  -BB       Subjective Pain    Able to rate subjective pain? yes  -BB    Subjective Pain Comment LE are good. Occasional knee pain  -BB       Posture/Observations    Posture/Observations Comments Bilateral ER of feet in gait. No loss of balance noted this date  -BB       General ROM    GENERAL ROM COMMENTS BLE are WNL for normal gait  -BB       MMT (Manual Muscle Testing)    General MMT Comments Sitting hip and knee MMT grossly 4+/5  -BB       Sensation    Sensation WNL? WNL  to crude touch  -BB       Balance Skills Training    SLS RLE 1\", LLE 3\"  -BB    Rhomberg WNL  -BB    Sharpened Rhomberg 5\" safely each  -BB       Transfers    Comment, (Transfers) independent without UE assist  -BB       Gait/Stairs (Locomotion)    Comment, (Gait/Stairs) Bilateral ER of feet. No assistive device.  -BB          User Key  (r) = Recorded By, (t) = Taken By, (c) = Cosigned By    Initials Name Provider Type    Aubree Wheeler, PT DPT Physical Therapist                                   PT OP Goals     Row Name 05/04/22 0886          PT Short Term Goals    STG Date to Achieve --  -BB            Time Calculation    PT Goal Re-Cert Due Date --  -BB           User Key  (r) = Recorded By, (t) = Taken By, (c) = Cosigned By    Initials Name Provider Type    Aubree Wheeler PT DPT Physical Therapist                 PT Assessment/Plan     Row Name 05/04/22 0889          PT Assessment    Functional Limitations Impaired gait;Limitations in functional capacity and performance;Decreased safety during functional activities  -BB     Impairments Gait;Balance;Coordination;Muscle strength  -BB     Assessment Comments Patient presents today s/p CVA. Patients balance appears WNL and does not pose as a fall risk per DGI and ZAYAS balance " testing, strength of BLE are WNL. Is noted to have mild RLE deficit with SLS but given HEP to practice. HEP given for generalized sitting strength of BLE and encourage to perform. Patient agrees with DC and states feels like walking is as it normally is.  -BB     Rehab Potential Good  -BB     Patient/caregiver participated in establishment of treatment plan and goals Yes  -BB     Patient would benefit from skilled therapy intervention Yes  -BB            PT Plan    PT Frequency --  -BB     Predicted Duration of Therapy Intervention (PT) DC  -BB     PT Plan Comments DC with Sitting HEP and balance HEP given  -BB           User Key  (r) = Recorded By, (t) = Taken By, (c) = Cosigned By    Initials Name Provider Type    Aubree Wheeler, HEATH DPT Physical Therapist                 OP Exercises     Row Name 05/04/22 0881             Subjective Comments    Subjective Comments see pt hx  -BB              Subjective Pain    Able to rate subjective pain? yes  -BB      Subjective Pain Comment LE are good. Occasional knee pain  -BB              Exercise 1    Exercise Name 1 evaluation/POC  -BB              Exercise 2    Exercise Name 2 ZAYAS, DGI  -BB      Additional Comments WNL- No a fall risk  -BB              Exercise 3    Exercise Name 3 CTSIB  -BB      Additional Comments Firm EO:0.54, firn EC:1.87, Foam EO:1.02 Foam: 2.95, composite:1.59  -BB              Exercise 4    Exercise Name 4 Sitting TR/HR  -BB      Sets 4 1  -BB      Reps 4 5  -BB      Additional Comments HEP  -BB              Exercise 5    Exercise Name 5 sitting iso HS, LAQ, sitting march  -BB      Additional Comments HEP  -BB            User Key  (r) = Recorded By, (t) = Taken By, (c) = Cosigned By    Initials Name Provider Type    Aubree Wheeler, PT DPT Physical Therapist                             Outcome Measure Options: Zayas Balance, Dynamic Gait Index  Zayas Balance Scale  Sitting to Standing: able to stand without using hands and stabilize  independently  Standing Unsupported: able to stand safely for 2 minutes  Sitting with Back Unsupported but Feet Supported on Floor or on Stool: able to sit safely and securely for 2 minutes  Standing to Sitting: sits safely with minimal use of hands  Transfers: able to transfer safely with minor use of hands  Standing Unsupported with Eyes Closed: able to stand 10 seconds with supervision  Standing Unsupported with Feet Together: able to place feet together independently and stand 1 minute safely  Reaching Forward with Outstretched Arm While Standing: can reach forward 12 cm (5 inches)   Object From the Floor From a Standing Position: able to  object safely and easily  Turning to Look Behind Over Left and Right Shoulders While Standing: looks behind from both sides and weight shifts well  Turn 360 Degrees: able to turn 360 degrees safely in 4 seconds or less  Place Alternate Foot on Step or Stool While Standing Unsupported: able to stand independently and safely and complete 8 steps in 20 seconds  Standing Unsupported with One Foot in Front: able to place foot tandem independently and hold 30 seconds  Standing on One Leg: tries to lift leg unable to hold 3 seconds but remains standing independently  Nolasco Total Score: 51  Dynamic Gait Index (DGI)  Gait Level Surface: Normal: walks 20’, no assistive devices, good speed, no evidence for imbalance, normal gait pattern  Change in Gait Speed: Normal: Able to smoothly change walking speed without loss of balance or gait deviation. Shows significant difference in walking speeds between normal, fast and slow paces.  Gait with Horizontal Head Turns: Normal: Performs head turns smoothly with no change in gait.  Gait with Vertical Head Turns: Normal: Performs head turns smoothly with no change in gait.  Gait and Pivot Turn: Normal: Pivot turns safely within 3 seconds and stops quickly with no loss of balance.  Step Over Obstacle: Normal: Is able to step over box  without changing gait speed, no evidence for imbalance.  Step Around Obstacles: Normal: Is able to walk safely around cones safely without changing gait speed, no evidence of imbalance.  Steps: Normal: Alternating feet, no rail.  Dynamic Gait Index Score: 24      Time Calculation:   Start Time: 0854  Stop Time: 0930  Time Calculation (min): 36 min  Therapy Charges for Today     Code Description Service Date Service Provider Modifiers Qty    96130178975 HC PT EVAL LOW COMPLEXITY 3 5/4/2022 Aubree Garcia, PT DPT GP 1          PT G-Codes  Outcome Measure Options: Nolasco Balance, Dynamic Gait Index  Nolasco Total Score: 51              Aubree Garcia, PT DPT  5/4/2022

## 2022-05-04 NOTE — THERAPY EVALUATION
Outpatient Occupational Therapy Ortho Initial Evaluation  [facility]     Patient Name: Filiberto Lynch  : 1955  MRN: [id number]  Today's Date: 2022      Visit Date: 2022    Visits: - initial OT evaluation   % Improvement: TBD  Re-cert Date: 2022  MD Appointment: TBD    Therapy Diagnosis: CVA- impaired coordination, decreased  strength, decreased act tolerance, and impaired ADLs/IADLs.      Patient Active Problem List   Diagnosis   • Facial numbness        Past Medical History:   Diagnosis Date   • Hypertension         Past Surgical History:   Procedure Laterality Date   • KNEE SURGERY Right     ORIF          Visit Dx:    ICD-10-CM ICD-9-CM   1. Cerebrovascular accident (CVA) due to occlusion of left posterior cerebral artery (HCC)  I63.532 434.91   2. Impaired coordination of upper extremity  R27.8 781.3   3. Impaired instrumental activities of daily living (IADL)  Z78.9 V49.89   4. Impaired mobility and ADLs  Z74.09 V49.89    Z78.9         Patient History     Row Name 22 0800             History    Chief Complaint Muscle weakness;Fatigue/poor endurance;Balance Problems;Difficulty with daily activities  -      Date Current Problem(s) Began 22  -      Brief Description of Current Complaint Is a 66-year-old male presents to OT evaluation post CVA on 3/27/2022.  Patient reports he woke up noticed that his speech was slurred, was dropping items, and had poor balance.  Reports he fell in the shower and then called his family who took him to [facility] for further assessment.  Patient reports he did not have additional therapy after approximately 2-day hospital stay.  Reports he lives by himself in a 1 level home with steps to enter.  Reports his family lives in [place] can assist as needed.  Reorts he has not returned to work yet and reports he is still dropping items occasionally.  -      Previous treatment for THIS PROBLEM Rehabilitation  in acute care  -    "   Patient/Caregiver Goals Return to prior level of function;Improve strength  -KH      Current Tobacco Use yes  -KH      Smoking Status occasional  -KH      Patient's Rating of General Health Good  -KH      Hand Dominance right-handed  -KH      Occupation/sports/leisure activities ride motorcycles, works on cars  -KH      Patient seeing anyone else for problem(s)? PT, SLP  -KH      What clinical tests have you had for this problem? MRI;CT scan  -KH      Results of Clinical Tests L PCA  -KH      Related/Recent Hospitalizations Yes  -KH      Location (Hospital Name) [facility]  -KH              Pain     Pain Location Back;Knee;Hand  -KH      Pain at Present 0  -KH      Pain at Best 0  -KH      Pain at Worst 6  -KH      Pain Frequency Intermittent  -KH      Pain Description Aching  -KH      What Performance Factors Make the Current Problem(s) WORSE? \"worse throughout the day\"  -KH      Is your sleep disturbed? No  -KH      Is medication used to assist with sleep? No  -KH      Total hours of sleep per night ~10-12 hours  -KH      What position do you sleep in? 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 n/a- does report dropping items  -KH              Fall Risk Assessment    Any falls in the past year: Yes  -KH      Number of falls reported in the last 12 months 2-3  -KH      Factors that contributed to the fall: Other (comment);Lost balance  CVA  -KH      Does patient have a fear of falling No  -KH              Services    Prior Rehab/Home Health Experiences No  -KH      Are you currently receiving Home Health services No  -KH      Do you plan to receive Home Health services in the near future No  -KH              Daily Activities    Primary Language English  -KH              Safety    Are you being hurt, hit, or frightened by anyone at home or in your life? No  -KH      Are you being neglected by a caregiver No  -KH      Have you " had any of the following issues with Panic Attacks;Depression  -            User Key  (r) = Recorded By, (t) = Taken By, (c) = Cosigned By    Initials Name Provider Type    Karolina Carcamo OT Occupational Therapist                   OT Neuro     Row Name 05/04/22 0800             Subjective Comments    Subjective Comments Pt reports he lives alone in a 1 level home with 3-4 steps to enter. Has a tub/shower. No DME. Family lives in [place].  -              Precautions and Contraindications    Precautions/Limitations fall precautions  -              Home Living    Current Living Arrangements home  -      Home Accessibility stairs to enter home  -      Number of Stairs, Main Entrance four  -KH      Stair Railings, Main Entrance none  -              Vision- Basic    Current Vision Wears glasses only for reading  -              Vision - Complex Assessment    Tracking Able to track stimulus in all quads without difficulty  -      Additional Comments Report he feels vision may have gotten worse since CVA. Educated pt to get vision assessed by eye doctor.  -              Sensation    Light Touch No apparent deficits  -      Sharp/Dull No apparent deficits  -              Perception    Inattention/Neglect Appears intact  -              General ROM    GENERAL ROM COMMENTS BUE AROM WFLs  -              MMT (Manual Muscle Testing)    General MMT Comments BUE grossly 4+/5  -            User Key  (r) = Recorded By, (t) = Taken By, (c) = Cosigned By    Initials Name Provider Type    Karolina Carcamo OT Occupational Therapist              Hand Therapy (last 24 hours)     Hand Eval     Row Name 05/04/22 0800             Hand  Strength     Strength Affected Side Bilateral  -               Strength Right    Right  Test 1 55  -KH      Right  Test 2 55  -KH      Right  Test 3 45  -KH       Strength Average Right 51.67  -KH               Strength Left    Left   Test 1 30  -KH      Left  Test 2 30  -KH      Left  Test 3 30  -KH       Strength Average Left 30  -KH            User Key  (r) = Recorded By, (t) = Taken By, (c) = Cosigned By    Initials Name Provider Type    Karolina Carcamo, OT Occupational Therapist                   OT Goals     Row Name 05/04/22 1149 05/04/22 0800       OT Short Term Goals    STG 1 -- Pt will improve B  strength by 10# or more to demo improvment in  required for ADLs/IADLs.  -    STG 1 Progress -- New  -    STG 2 -- Pt will complete object manipulation/translation skills IND with less than 2 drops to improve FMC required for ADLs/IADLs.  -    STG 2 Progress -- New  -    STG 3 -- Pt will complete 10 mins on BUE ergometer at 1.3 resistance or more to improve BUE strength and act tolerance required for ADLs/IADLs.  -    STG 3 Progress -- New  -       Long Term Goals    LTG 1 -- Pt will actively participate in HEPs with emphasis on FMC/GMC, strengthening, and/or ADLs/IADLs.  -    LTG 1 Progress -- New  -    LTG 2 -- Pt will improve B 9 hole peg test to 28 secs or better to demo improvement in FMC required for ADLs/IADLs.  -    LTG 2 Progress -- New  -    LTG 3 -- Pt will improve B blocks and box test to 60 blocks or more to demo improvement in GMC required for ADLs/IADLs.  -    LTG 3 Progress -- New  -    LTG 4 -- Pt will demo 5/5 MMT of BUE.  -    LTG 4 Progress -- New  -       Time Calculation    OT Goal Re-Cert Due Date 05/25/22  - 05/25/22  -          User Key  (r) = Recorded By, (t) = Taken By, (c) = Cosigned By    Initials Name Provider Type    Karolina Carcamo OT Occupational Therapist                 OT Assessment/Plan     Row Name 05/04/22 0800          OT Assessment    Functional Limitations Decreased safety during functional activities;Limitation in home management;Limitations in community activities;Performance in work activities;Performance in self-care ADL;Performance in  leisure activities  -     Impairments Dexterity;Endurance;Coordination;Pain;Muscle strength  -     Assessment Comments Pt presents with decreased FMC, impaired  strength, decreased act tolerance, and impaired ADLs/IADLs. Pt would benefit from continued skilled OT services to improve/further address these deficits. Without skilled OT intervention, pt is at risk for further functional decline.  -     OT Diagnosis CVA- impaired coordination, decreased  strength, decreased act tolerance, and impaired ADLs/IADLs.  -     OT Rehab Potential Good  -     Patient/caregiver participated in establishment of treatment plan and goals Yes  -     Patient would benefit from skilled therapy intervention Yes  -KH            OT Plan    OT Frequency 2x/week  -     Predicted Duration of Therapy Intervention (OT) 3-4wks with further TBD  -     Planned CPT's? OT EVAL MOD COMPLEXITY: 32603;OT RE-EVAL: 30816;OT THER ACT EA 15 MIN: 23986GT;OT THER PROC EA 15 MIN: 07816US;OT NEUROMUSC RE EDUCATION EA 15 MIN: 66347;OT SELF CARE/MGMT/TRAIN 15 MIN: 61994;OT HOT/COLD PACK;OT THER SUPP EA 15 MIN:;OT CARE PLAN EA 15 MIN  -     OT Plan Comments Pt would benefit from continued skilled OT services 2x/wk for 3-4wks with further TBD.  -           User Key  (r) = Recorded By, (t) = Taken By, (c) = Cosigned By    Initials Name Provider Type    Karolina Carcamo OT Occupational Therapist                       Outcome Measure Options: 9 Hole Peg, Quick DASH, Box and Blocks  9 Hole Peg  9-Hole Peg Left: 33 secs  9-Hole Peg Right: 34 secs  Box and Blocks  Box and Blocks Left: 52  Box and Blocks Right: 51  Quick DASH  Open a tight or new jar.: Mild Difficulty  Do heavy household chores (e.g., wash walls, wash floors): Mild Difficulty  Carry a shopping bag or briefcase: Mild Difficulty  Wash your back: No Difficulty  Use a knife to cut food: No Difficulty  Recreational activities in which you take some force or impact through your  arm, should or hand (e.g. golf, hammering, tennis, etc.): No Difficulty  During the past week, to what extent has your arm, shoulder, or hand problem interfered with your normal social activites with family, friends, neighbors or groups?: Not at all  During the past week, were you limited in your work or other regular daily activities as a result of your arm, shoulder or hand problem?: Slightly Limited  Arm, Shoulder, or hand pain: Mild  Tingling (pins and needles) in your arm, shoulder, or hand: None  During the past week, how much difficulty have you had sleeping because of the pain in your arm, shoulder or hand?: No difficulty  Number of Questions Answered: 11  Quick DASH Score: 11.36         Time Calculation:    OT Start Time: 0805  OT Stop Time: 0845  OT Time Calculation (min): 40 min     Therapy Charges for Today     Code Description Service Date Service Provider Modifiers Qty    86923443461 HC OT EVAL MOD COMPLEXITY 3 5/4/2022 Karolina Zee OT GO 1                  Karolina Zee OT  5/4/2022

## 2022-05-11 ENCOUNTER — HOSPITAL ENCOUNTER (OUTPATIENT)
Dept: OCCUPATIONAL THERAPY | Facility: HOSPITAL | Age: 67
Setting detail: THERAPIES SERIES
Discharge: HOME | End: 2022-05-11

## 2022-05-11 DIAGNOSIS — I63.532 CEREBROVASCULAR ACCIDENT (CVA) DUE TO OCCLUSION OF LEFT POSTERIOR CEREBRAL ARTERY: Primary | ICD-10-CM

## 2022-05-11 DIAGNOSIS — R27.8 IMPAIRED COORDINATION OF UPPER EXTREMITY: ICD-10-CM

## 2022-05-11 DIAGNOSIS — Z74.09 IMPAIRED MOBILITY AND ADLS: ICD-10-CM

## 2022-05-11 DIAGNOSIS — Z78.9 IMPAIRED MOBILITY AND ADLS: ICD-10-CM

## 2022-05-11 DIAGNOSIS — Z78.9 IMPAIRED INSTRUMENTAL ACTIVITIES OF DAILY LIVING (IADL): ICD-10-CM

## 2022-05-11 PROCEDURE — 97530 THERAPEUTIC ACTIVITIES: CPT

## 2022-05-11 PROCEDURE — 97110 THERAPEUTIC EXERCISES: CPT

## 2022-05-11 NOTE — THERAPY TREATMENT NOTE
Outpatient Occupational Therapy Ortho Treatment Note  [facility]     Patient Name: Filiberto Lynch  : 1955  MRN: [id number]  Today's Date: 2022        Visit Date: 2022  Visits: 2/3  % Improvement: TBD  Re-cert Date: 2022  MD Appointment: [facility]ARLE[facility]     Therapy Diagnosis: CVA- impaired coordination, decreased  strength, decreased act tolerance, and impaired ADLs/IADLs.     Patient Active Problem List   Diagnosis   • Facial numbness        Past Medical History:   Diagnosis Date   • Hypertension         Past Surgical History:   Procedure Laterality Date   • KNEE SURGERY Right     ORIF          Visit Dx:    ICD-10-CM ICD-9-CM   1. Cerebrovascular accident (CVA) due to occlusion of left posterior cerebral artery (HCC)  I63.532 434.91   2. Impaired coordination of upper extremity  R27.8 781.3   3. Impaired instrumental activities of daily living (IADL)  Z78.9 V49.89   4. Impaired mobility and ADLs  Z74.09 V49.89    Z78.9          Therapy Education  Education Details: green putty, translation skills, object manipulation  Given: HEP  Program: New  How Provided: Verbal, Demonstration, Written  Provided to: Patient  Level of Understanding: Verbalized, Demonstrated     OT Assessment/Plan     Row Name 22 1340          OT Assessment    Functional Limitations Decreased safety during functional activities;Limitation in home management;Limitations in community activities;Performance in work activities;Performance in self-care ADL;Performance in leisure activities  -     Impairments Dexterity;Endurance;Coordination;Pain;Muscle strength  -     Assessment Comments Pt dillon OT tx session well this date. Good progress towards goals. Continue OT POC.  -     OT Diagnosis CVA- impaired coordination, decreased  strength, decreased act tolerance, and impaired ADLs/IADLs.  -     OT Rehab Potential Good  -SAULO     Patient/caregiver participated in establishment of treatment plan and goals Yes  -SAULO      Patient would benefit from skilled therapy intervention Yes  -            OT Plan    OT Frequency 2x/week  -     Predicted Duration of Therapy Intervention (OT) 3-4wks  -     Planned Therapy Interventions (Optional Details) home exercise program;motor coordination training;patient/family education;strengthening  -     OT Plan Comments continue OT POC  -           User Key  (r) = Recorded By, (t) = Taken By, (c) = Cosigned By    Initials Name Provider Type    Karolina Carcamo OT Occupational Therapist                  OT Goals     Row Name 05/11/22 1340          OT Short Term Goals    STG 1 Pt will improve B  strength by 10# or more to demo improvment in  required for ADLs/IADLs.  -     STG 1 Progress Progressing  -     STG 2 Pt will complete object manipulation/translation skills IND with less than 2 drops to improve FMC required for ADLs/IADLs.  -     STG 2 Progress Met;Ongoing  -     STG 3 Pt will complete 10 mins on BUE ergometer at 1.3 resistance or more to improve BUE strength and act tolerance required for ADLs/IADLs.  -     STG 3 Progress Progressing  -            Long Term Goals    LTG 1 Pt will actively participate in HEPs with emphasis on FMC/GMC, strengthening, and/or ADLs/IADLs.  -     LTG 1 Progress Ongoing  -     LTG 2 Pt will improve B 9 hole peg test to 28 secs or better to demo improvement in FMC required for ADLs/IADLs.  -     LTG 2 Progress Not Met  -     LTG 3 Pt will improve B blocks and box test to 60 blocks or more to demo improvement in GMC required for ADLs/IADLs.  -     LTG 3 Progress Not Met  -     LTG 4 Pt will demo 5/5 MMT of BUE.  -     LTG 4 Progress Progressing  -            Time Calculation    OT Goal Re-Cert Due Date 05/25/22  -           User Key  (r) = Recorded By, (t) = Taken By, (c) = Cosigned By    Initials Name Provider Type    Karolina Carcamo OT Occupational Therapist                   OT Exercises     Row Name  05/11/22 1340             Subjective Comments    Subjective Comments Pt reports he returns to work on Monday 5/16.  -              Subjective Pain    Able to rate subjective pain? yes  -      Pre-Treatment Pain Level 0  -KH      Post-Treatment Pain Level 0  -KH              Total Minutes    67850 - OT Therapeutic Exercise Minutes 20  -KH      56266 - OT Therapeutic Activity Minutes 25  -KH              Exercise 1    Exercise Name 1 Issued green putty HEP to improve B  strength required for ADLs/IADls. Pt dillon well. VCs for proper technique.  -      Sets 1 1  -KH      Reps 1 10  -KH              Exercise 2    Exercise Name 2 Pt completed translation skills with L hand to improve FMC required for ADLs/IADLs. Pt dillon well. Educated to complete at home. Verbalized understanding.  -KH      Sets 2 1  -KH      Reps 2 10  -KH              Exercise 3    Exercise Name 3 Pt completed object manipulation skills with various size coins in L hand to improve FMC required for ADLs/IADLs. x1 drop.  -KH      Sets 3 2  -KH      Reps 3 5  -KH              Exercise 4    Exercise Name 4 Pt completed BUE ergometer at 1.4 resistance to improve BUE strength and act tolerance required for ADLs/IADLs. Pt dillon well. Initially c/o L elbow pain but reports resolving after ~2 mins.  -      Time (Minutes) 14 8  -KH            User Key  (r) = Recorded By, (t) = Taken By, (c) = Cosigned By    Initials Name Provider Type    Karolina Carcamo OT Occupational Therapist                    Time Calculation:   OT Start Time: 1340  OT Stop Time: 1425  OT Time Calculation (min): 45 min  Timed Charges  28657 - OT Therapeutic Exercise Minutes: 20  69854 - OT Therapeutic Activity Minutes: 25  Total Minutes  Timed Charges Total Minutes: 45   Total Minutes: 45     Therapy Charges for Today     Code Description Service Date Service Provider Modifiers Qty    29068801593  OT THER PROC EA 15 MIN 5/11/2022 Karolina Zee OT GO 1    [id number]  OT  THERAPEUTIC ACT EA 15 MIN 5/11/2022 Karolina Zee, OT GO 2                    Karolina Zee, RAVI  5/11/2022

## 2022-05-16 ENCOUNTER — APPOINTMENT (OUTPATIENT)
Dept: OCCUPATIONAL THERAPY | Facility: HOSPITAL | Age: 67
End: 2022-05-16

## 2022-05-18 ENCOUNTER — HOSPITAL ENCOUNTER (OUTPATIENT)
Dept: SPEECH THERAPY | Facility: HOSPITAL | Age: 67
Setting detail: THERAPIES SERIES
Discharge: HOME | End: 2022-05-18

## 2022-05-18 ENCOUNTER — HOSPITAL ENCOUNTER (OUTPATIENT)
Dept: OCCUPATIONAL THERAPY | Facility: HOSPITAL | Age: 67
Setting detail: THERAPIES SERIES
Discharge: HOME | End: 2022-05-18

## 2022-05-18 DIAGNOSIS — I63.532 CEREBROVASCULAR ACCIDENT (CVA) DUE TO OCCLUSION OF LEFT POSTERIOR CEREBRAL ARTERY: Primary | ICD-10-CM

## 2022-05-18 DIAGNOSIS — R27.8 IMPAIRED COORDINATION OF UPPER EXTREMITY: ICD-10-CM

## 2022-05-18 DIAGNOSIS — R48.9 SYMBOLIC DYSFUNCTION: Primary | ICD-10-CM

## 2022-05-18 DIAGNOSIS — Z74.09 IMPAIRED MOBILITY AND ADLS: ICD-10-CM

## 2022-05-18 DIAGNOSIS — Z78.9 IMPAIRED INSTRUMENTAL ACTIVITIES OF DAILY LIVING (IADL): ICD-10-CM

## 2022-05-18 DIAGNOSIS — Z78.9 IMPAIRED MOBILITY AND ADLS: ICD-10-CM

## 2022-05-18 PROCEDURE — 97110 THERAPEUTIC EXERCISES: CPT

## 2022-05-18 PROCEDURE — 92507 TX SP LANG VOICE COMM INDIV: CPT | Performed by: SPEECH-LANGUAGE PATHOLOGIST

## 2022-05-18 PROCEDURE — 97530 THERAPEUTIC ACTIVITIES: CPT

## 2022-05-18 NOTE — THERAPY TREATMENT NOTE
Outpatient Occupational Therapy Ortho Treatment Note  [facility]     Patient Name: Filiberto Lynch  : 1955  MRN: [id number]  Today's Date: 2022        Visit Date: 2022  Visits: 3/5  % Improvement: TBD  Re-cert Date: 2022  MD Appointment: TBD     Therapy Diagnosis: CVA- impaired coordination, decreased  strength, decreased act tolerance, and impaired ADLs/IADLs.     Patient Active Problem List   Diagnosis   • Facial numbness        Past Medical History:   Diagnosis Date   • Hypertension         Past Surgical History:   Procedure Laterality Date   • KNEE SURGERY Right     ORIF          Visit Dx:    ICD-10-CM ICD-9-CM   1. Cerebrovascular accident (CVA) due to occlusion of left posterior cerebral artery (HCC)  I63.532 434.91   2. Impaired coordination of upper extremity  R27.8 781.3   3. Impaired instrumental activities of daily living (IADL)  Z78.9 V49.89   4. Impaired mobility and ADLs  Z74.09 V49.89    Z78.9          Therapy Education  Education Details: green theraband  Given: HEP  Program: New  How Provided: Verbal, Demonstration, Written  Provided to: Patient  Level of Understanding: Verbalized, Demonstrated     OT Assessment/Plan     Row Name 22 1430          OT Assessment    Functional Limitations Decreased safety during functional activities;Limitation in home management;Limitations in community activities;Performance in work activities;Performance in self-care ADL;Performance in leisure activities  -     Impairments Dexterity;Endurance;Coordination;Pain;Muscle strength  -     Assessment Comments Pt dillon OT tx session well this date. Good progress towards goals. Continue OT POC.  -     OT Diagnosis CVA- impaired coordination, decreased  strength, decreased act tolerance, and impaired ADLs/IADLs.  -     OT Rehab Potential Good  -     Patient/caregiver participated in establishment of treatment plan and goals Yes  -     Patient would benefit from skilled  therapy intervention Yes  -            OT Plan    OT Frequency 2x/week  -     Predicted Duration of Therapy Intervention (OT) 3-4wks  -     Planned Therapy Interventions (Optional Details) home exercise program;motor coordination training;patient/family education;strengthening  -     OT Plan Comments continue OT POC  -           User Key  (r) = Recorded By, (t) = Taken By, (c) = Cosigned By    Initials Name Provider Type    Karolina Carcamo OT Occupational Therapist                  OT Goals     Row Name 05/18/22 1430          OT Short Term Goals    STG 1 Pt will improve B  strength by 10# or more to demo improvment in  required for ADLs/IADLs.  -     STG 1 Progress Progressing  -     STG 2 Pt will complete object manipulation/translation skills IND with less than 2 drops to improve FMC required for ADLs/IADLs.  -     STG 2 Progress Met;Ongoing  -     STG 3 Pt will complete 10 mins on BUE ergometer at 1.3 resistance or more to improve BUE strength and act tolerance required for ADLs/IADLs.  -     STG 3 Progress Met  -            Long Term Goals    LTG 1 Pt will actively participate in HEPs with emphasis on FMC/GMC, strengthening, and/or ADLs/IADLs.  -     LTG 1 Progress Ongoing  -     LTG 2 Pt will improve B 9 hole peg test to 28 secs or better to demo improvement in FMC required for ADLs/IADLs.  -     LTG 2 Progress Not Met  -     LTG 3 Pt will improve B blocks and box test to 60 blocks or more to demo improvement in GMC required for ADLs/IADLs.  -     LTG 3 Progress Not Met  -     LTG 4 Pt will demo 5/5 MMT of BUE.  -     LTG 4 Progress Progressing  -            Time Calculation    OT Goal Re-Cert Due Date 05/25/22  -           User Key  (r) = Recorded By, (t) = Taken By, (c) = Cosigned By    Initials Name Provider Type    Karolina Carcamo OT Occupational Therapist                   OT Exercises     Row Name 05/18/22 1430             Subjective Comments     Subjective Comments Pt reports MD did not release pt for work yet. Reports MD request ~2 more wks off.  -KH              Subjective Pain    Able to rate subjective pain? yes  -KH      Pre-Treatment Pain Level 0  -KH      Post-Treatment Pain Level 0  -KH              Total Minutes    91803 - OT Therapeutic Exercise Minutes 25  -KH      88859 - OT Therapeutic Activity Minutes 20  -KH              Exercise 1    Exercise Name 1 Utilized 7# digi flex to improve B  required for ADLs/IADLs. Pt dillon well. VCs for proper technique.  -      Sets 1 3  -KH      Reps 1 10  -KH              Exercise 2    Exercise Name 2 Pt completed object manipulation skills with L hand to improve FMC required for ADLs/IADLs.  -      Sets 2 2  -KH      Reps 2 8  -KH              Exercise 3    Exercise Name 3 Issued green theraband HEP to improve BUE strength required for ADLs/IADLs. Pt dillon well. VCs for proper technique.  -      Sets 3 1  -KH      Reps 3 10  -KH              Exercise 4    Exercise Name 4 Pt completed BUE ergometer at 1.5 resistance to improve BUE strength and act tolerance required for ADLs/IADLs. Pt dillon well.  -      Time (Minutes) 14 10  -KH              Exercise 5    Exercise Name 5 Pt lifted weighted box to 54in platform to improve functional act tolerance and simulate job task. Pt dillon well. Did report some fatigue.  -      Weights/Plates 5 10  -KH      Sets 5 1  -KH      Reps 5 10  -KH            User Key  (r) = Recorded By, (t) = Taken By, (c) = Cosigned By    Initials Name Provider Type    Karolina Carcamo OT Occupational Therapist                  Time Calculation:   OT Start Time: 1435  OT Stop Time: 1520  OT Time Calculation (min): 45 min  Timed Charges  46995 - OT Therapeutic Exercise Minutes: 25  41164 - OT Therapeutic Activity Minutes: 20  Total Minutes  Timed Charges Total Minutes: 45   Total Minutes: 45     Therapy Charges for Today     Code Description Service Date Service Provider Modifiers Qty     52408220098  OT THER PROC EA 15 MIN 5/18/2022 Karolina Zee, OT GO 2    [id number] HC OT THERAPEUTIC ACT EA 15 MIN 5/18/2022 Karolina Zee OT GO 1                    Karolina Zee OT  5/18/2022

## 2022-05-18 NOTE — THERAPY TREATMENT NOTE
Outpatient Speech Language Pathology   Adult Speech Language Cognitive Treatment Note  [facility]     Patient Name: Filiberto Lynch  : 1955  MRN: [id number]  Today's Date: 2022         Visit Date: 2022   Patient Active Problem List   Diagnosis   • Facial numbness          Visit Dx:    ICD-10-CM ICD-9-CM   1. Symbolic dysfunction  R48.9 784.60        OP SLP Assessment/Plan - 22 1345        SLP Assessment    Functional Problems Speech Language- Adult/Cognition  -EC    Impact on Function: Adult Speech Language/Cognition Unable to complete specified job requirements  -EC    Clinical Impression: Speech Language-Adult/Congnition Mild:;Cognitive Communication Impairment  -EC    Functional Problems Comment Pt concerned about processing and memory for returning to job using computer. Pt concerned his fatigue will be a barrier to job performance.  -EC    Clinical Impression Comments Filiberto performed well on recall of 3 words(3 to 4 syllables and unrelated) he had difficulty completing 4 unrelated directions 50% accuracy. he states he is completing ADLs and driving independently since his CVA  -EC    SLP Diagnosis symbolic dysfunction  -EC    Prognosis Good (comment)  -EC       SLP Plan    Frequency 1xwk  -EC    Duration 4 wks  -EC    Planned CPT's? SLP INDIVIDUAL SPEECH THERAPY: 60300  -EC    Expected Duration of Therapy Session (SLP Eval) 45  -EC    Plan Comments continue POC  -EC          User Key  (r) = Recorded By, (t) = Taken By, (c) = Cosigned By    Initials Name Provider Type    EC Lina Esposito CCC-SLP Speech and Language Pathologist                 SLP SLC Evaluation - 22 1348        Communication Assessment/Intervention    Document Type therapy note (daily note)  -EC    Subjective Information no complaints  -EC    Patient Observations alert;cooperative;agree to therapy  -EC    Patient Effort good  -EC       General Information    Patient Profile Reviewed yes  -EC       Pain  Scale: Numbers Pre/Post-Treatment    Pretreatment Pain Rating 0/10 - no pain  -EC    Posttreatment Pain Rating 0/10 - no pain  -EC       Cognitive Assessment Intervention- SLP    Cognitive Function (Cognition) WFL  -EC    Orientation Status (Cognition) WFL  -EC    Memory (Cognitive) mild impairment  -EC    Attention (Cognitive) WFL  -EC    Thought Organization (Cognitive) mild impairment  -EC    Reasoning (Cognitive) WFL  -EC    Problem Solving (Cognitive) WFL  -EC    Functional Math (Cognitive) WFL  -EC    Executive Function (Cognition) WFL  -EC    Pragmatics (Communication) WFL  -EC       SLP Evaluation Clinical Impressions    SLP Diagnosis mild cognitive deficit/symboluc dysfunction  -EC    Rehab Potential/Prognosis good  -EC    SLC Criteria for Skilled Therapy Interventions Met yes  -EC    Functional Impact difficulty completing vocational tasks  -EC       Recommendations    Therapy Frequency (SLP SLC) 1 day per week  -EC    Predicted Duration Therapy Intervention (Days) 3 weeks  -EC       Cognitive Linguistic Treatment Objectives    Organizational Skills Selection organizational skills, SLP goal 1  -EC       Memory Skills Goal 1 (SLP)    Improve Memory Skills Through Goal 1 (SLP) recalling unrelated word lists immediately;100%  -EC    Time Frame (Memory Skills Goal 1, SLP) by discharge  -EC    Progress (Memory Skills Goal 1, SLP) 80%  -EC    Progress/Outcomes (Memory Skills Goal 1, SLP) goal ongoing  -EC    Comment (Memory Skills Goal 1, SLP) pt recalled 3 3-4 syllable words with 100% accuracy.  he recalled 4 3-4 syllable words with 80% accuracy.  -EC       Memory Skills Goal 2 (SLP)    Improve Memory Skills Through Goal 2 (SLP) listen to a paragraph and answer questions;100%;independently (over 90% accuracy)  -EC    Time Frame (Memory Skills Goal 2, SLP) by discharge  -EC    Progress (Memory Skills Goal 2, SLP) 80%  -EC    Progress/Outcomes (Memory Skills Goal 2, SLP) goal ongoing  -EC    Comment (Memory  Skills Goal 2, SLP) 4/5sentence paragraph recall with 80% and no cues  -EC       Organizational Skills Goal 1 (SLP)    Improve Thought Organization Through Goal 1 (SLP) completing mental manipulation task;100%;independently (over 90% accuracy)  -EC    Time Frame (Thought Organization Skills Goal 1, SLP) by discharge  -EC    Progress (Thought Organization Skills Goal 1, SLP) 70%  -EC    Progress/Outcomes (Thought Organization Skills Goal 1, SLP) goal ongoing  -EC    Comment (Thought Organization Skills Goal 1, SLP) word problems involving money with 77% accuracy with no cues and very quick responses.  -EC          User Key  (r) = Recorded By, (t) = Taken By, (c) = Cosigned By    Initials Name Provider Type    Lina Shetty CCC-SLP Speech and Language Pathologist                               SLP OP Goals     Row Name 05/18/22 1449          SLP Time Calculation    SLP Goal Re-Cert Due Date 06/02/22  -EC           User Key  (r) = Recorded By, (t) = Taken By, (c) = Cosigned By    Initials Name Provider Type    Lina Shetty CCC-SLP Speech and Language Pathologist               OP SLP Education     Row Name 05/18/22 1345       Education    Barriers to Learning No barriers identified  -EC    Education Provided Patient demonstrated recommended strategies;Patient requires further education on strategies, risks  -EC    Assessed Learning needs;Learning motivation;Learning preferences;Learning readiness  -EC    Learning Motivation Strong  -EC    Learning Method Explanation;Demonstration;Written materials  -EC    Teaching Response Verbalized understanding;Demonstrated understanding  -EC    Education Comments SLP educated for progress on goals including ability to recall complex new information this date and ability to perform mental manipulation tasks quickly.  -EC          User Key  (r) = Recorded By, (t) = Taken By, (c) = Cosigned By    Initials Name Effective Dates    EC Lina Esposito CCC-SLP  06/16/21 -                      Time Calculation:   SLP Start Time: 1345  SLP Stop Time: 1430  SLP Time Calculation (min): 45 min  Total Timed Code Minutes- SLP: 45 minute(s)  Untimed Charges  74544-HD Treatment/ST Modification Prosth Aug Alter : 45  Total Minutes  Untimed Charges Total Minutes: 45   Total Minutes: 45    Therapy Charges for Today     Code Description Service Date Service Provider Modifiers Qty    73769701671  ST TREATMENT SPEECH 3 5/18/2022 Lina Esposito CCC-SLP GN 1                   HESHAM Majano  5/18/2022

## 2022-05-25 ENCOUNTER — APPOINTMENT (OUTPATIENT)
Dept: SPEECH THERAPY | Facility: HOSPITAL | Age: 67
End: 2022-05-25

## 2022-05-31 ENCOUNTER — DOCUMENTATION (OUTPATIENT)
Dept: OCCUPATIONAL THERAPY | Facility: HOSPITAL | Age: 67
End: 2022-05-31

## 2022-06-06 ENCOUNTER — APPOINTMENT (OUTPATIENT)
Dept: OCCUPATIONAL THERAPY | Facility: HOSPITAL | Age: 67
End: 2022-06-06

## 2022-09-29 ENCOUNTER — TRANSCRIBE ORDERS (OUTPATIENT)
Dept: SPEECH THERAPY | Facility: HOSPITAL | Age: 67
End: 2022-09-29

## 2022-09-29 ENCOUNTER — TRANSCRIBE ORDERS (OUTPATIENT)
Dept: PHYSICAL THERAPY | Facility: HOSPITAL | Age: 67
End: 2022-09-29

## 2022-09-29 ENCOUNTER — TRANSCRIBE ORDERS (OUTPATIENT)
Dept: OCCUPATIONAL THERAPY | Facility: HOSPITAL | Age: 67
End: 2022-09-29

## 2022-09-29 DIAGNOSIS — I63.432: Primary | ICD-10-CM

## 2022-10-05 ENCOUNTER — HOSPITAL ENCOUNTER (OUTPATIENT)
Dept: SPEECH THERAPY | Facility: HOSPITAL | Age: 67
Setting detail: THERAPIES SERIES
Discharge: HOME | End: 2022-10-05

## 2022-10-05 DIAGNOSIS — I63.432: ICD-10-CM

## 2022-10-05 PROCEDURE — 92523 SPEECH SOUND LANG COMPREHEN: CPT | Performed by: SPEECH-LANGUAGE PATHOLOGIST

## 2022-10-05 NOTE — THERAPY DISCHARGE NOTE
Outpatient Speech Language Pathology   Adult Speech Language Cognitive Eval/Discharge  [facility]     Patient Name: Filiberto Lynch  : 1955  MRN: [id number]  Today's Date: 10/5/2022         Visit Date: 10/05/2022    Patient Active Problem List   Diagnosis   • Facial numbness        Past Medical History:   Diagnosis Date   • Hypertension         Past Surgical History:   Procedure Laterality Date   • KNEE SURGERY Right     ORIF          Visit Dx:    ICD-10-CM ICD-9-CM   1. 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  -EK    Clinical Impression Comments Speech evaluation completed. Pt scored Normal on SLUMS and no speech/language or cognitive deficits noted. Pt asked SLP about his vision and driving. SLP educated pt on SLP cannot advise or make decisions regarding driving. SLP encouraged pt to reschedule eye appointment and he reports that he will. SLP also discussed MD townsend send pt to [employer] [facility] for driving test if needed. Again SLP educated that SLP could not authorize driving. Pt shares no concerns about speech or language. Pt is eager to return to work as able. SLP recommends no tx. SLP advised pt to call if any needs arise.  -EK    Patient would benefit from skilled therapy intervention No  -EK       SLP Plan    Frequency Evaluation only  -EK          User Key  (r) = Recorded By, (t) = Taken By, (c) = Cosigned By    Initials Name Provider Type    Lina Mcwilliams CCC-SLP Speech and Language Pathologist                 SLP SLC Evaluation - 10/05/22 1402        Communication  Assessment/Intervention    Document Type evaluation  -EK    Patient Observations alert;cooperative;agree to therapy  -EK    Patient/Family/Caregiver Comments/Observations none  -EK    Patient Effort good  -EK       General Information    Patient Profile Reviewed yes  -EK    Pertinent History Of Current Problem Pt with CVA six weeks ago and pt taken to [place]. Pt reports right side weakness and visual impairment.  -EK    Precautions/Limitations, Vision WFL  -EK    Precautions/Limitations, Hearing WFL  -EK    Prior Level of Function-Communication WFL  -EK       Pain    Additional Documentation Pain Scale: Numbers Pre/Post-Treatment (Group)  -EK       Pain Scale: Numbers Pre/Post-Treatment    Pretreatment Pain Rating 0/10 - no pain  -EK    Posttreatment Pain Rating 0/10 - no pain  -EK       Comprehension Assessment/Intervention    Comprehension Assessment/Intervention Auditory Comprehension  -EK       Auditory Comprehension Assessment/Intervention    Auditory Comprehension (Communication) WFL  -EK       Expression Assessment/Intervention    Expression Assessment/Intervention verbal expression  -EK       Verbal Expression Assessment/Intervention    Verbal Expression WFL  -EK       Oral Motor Structure and Function    Oral Motor Structure and Function WFL  -EK       Oral Musculature and Cranial Nerve Assessment    Oral Motor General Assessment WFL  -EK       Motor Speech Assessment/Intervention    Motor Speech Function WFL  -EK       Cursory Voice Assessment/Intervention    Quality and Resonance (Voice) WFL  -EK       Cognitive Assessment Intervention- SLP    Cognitive Function (Cognition) WFL  -EK    Orientation Status (Cognition) WFL  -EK    Memory (Cognitive) WFL  -EK    Attention (Cognitive) WFL  -EK    Thought Organization (Cognitive) WFL  -EK    Reasoning (Cognitive) WFL  -EK    Problem Solving (Cognitive) WFL  -EK    Functional Math (Cognitive) WFL  -EK       [facility] Mental Status  Examination    SLUMS Score 27  -EK    SLUMS Range 27-30: Normal (High school education or higher)  -EK    SLUMS Comments WFL  -EK       SLP Evaluation Clinical Impressions    SLC Criteria for Skilled Therapy Interventions Met no problems identified which require skilled intervention  -EK          User Key  (r) = Recorded By, (t) = Taken By, (c) = Cosigned By    Initials Name Provider Type    Lina Mcwilliams CCC-SLP Speech and Language Pathologist                               OP SLP Education     Row Name 10/05/22 1402       Education    Barriers to Learning No barriers identified  -EK    Learning Motivation Strong  -EK    Learning Method Explanation;Demonstration  -EK    Teaching Response Verbalized understanding;Demonstrated understanding  -EK    Education Comments SLP educated on outcome of evaluation and no tx recommended. SLP recommended pt follow up with eye appointment. SLP also educated pt on SLP could not give permission to drive.  -EK          User Key  (r) = Recorded By, (t) = Taken By, (c) = Cosigned By    Initials Name Effective Dates    Lina Mcwilliams CCC-SLP 06/16/21 -                            Time Calculation:   SLP Start Time: 1402  SLP Stop Time: 1430  SLP Time Calculation (min): 28 min  Total Timed Code Minutes- SLP: 28 minute(s)    Therapy Charges for Today     Code Description Service Date Service Provider Modifiers Qty    73239809515 HC ST EVAL SPEECH AND PROD W LANG  2 10/5/2022 Lina Pettit CCC-SLP GN 1                      HESHAM Finley  10/5/2022

## 2022-10-12 ENCOUNTER — HOSPITAL ENCOUNTER (OUTPATIENT)
Dept: PHYSICAL THERAPY | Facility: HOSPITAL | Age: 67
Setting detail: THERAPIES SERIES
Discharge: HOME | End: 2022-10-12

## 2022-10-12 ENCOUNTER — HOSPITAL ENCOUNTER (OUTPATIENT)
Dept: OCCUPATIONAL THERAPY | Facility: HOSPITAL | Age: 67
Setting detail: THERAPIES SERIES
Discharge: HOME | End: 2022-10-12

## 2022-10-12 DIAGNOSIS — R27.8 IMPAIRED COORDINATION OF UPPER EXTREMITY: ICD-10-CM

## 2022-10-12 DIAGNOSIS — Z78.9 IMPAIRED MOBILITY AND ADLS: ICD-10-CM

## 2022-10-12 DIAGNOSIS — R29.898 DECREASED GRIP STRENGTH: ICD-10-CM

## 2022-10-12 DIAGNOSIS — Z74.09 IMPAIRED MOBILITY AND ADLS: ICD-10-CM

## 2022-10-12 DIAGNOSIS — Z78.9 IMPAIRED INSTRUMENTAL ACTIVITIES OF DAILY LIVING (IADL): ICD-10-CM

## 2022-10-12 DIAGNOSIS — I63.432: Primary | ICD-10-CM

## 2022-10-12 DIAGNOSIS — I63.9 CEREBROVASCULAR ACCIDENT (CVA), UNSPECIFIED MECHANISM: Primary | ICD-10-CM

## 2022-10-12 PROCEDURE — 97166 OT EVAL MOD COMPLEX 45 MIN: CPT

## 2022-10-12 PROCEDURE — 97162 PT EVAL MOD COMPLEX 30 MIN: CPT

## 2022-10-12 NOTE — THERAPY EVALUATION
Outpatient Occupational Therapy Neuro Initial Evaluation  [facility]     Patient Name: Filiberto Lynch  : 1955  MRN: [id number]  Today's Date: 10/12/2022      Visit Date: 10/12/2022    Visits: - initial OT eval  % Improvement: TBD  Re-cert Date: 2022  MD Appointment: TBD    Therapy Diagnosis: CVA    Patient Active Problem List   Diagnosis   • Facial numbness        Past Medical History:   Diagnosis Date   • Hypertension         Past Surgical History:   Procedure Laterality Date   • KNEE SURGERY Right     ORIF          Visit Dx:      ICD-10-CM ICD-9-CM   1. Cerebrovascular accident (CVA), unspecified mechanism (HCC)  I63.9 434.91   2. Impaired mobility and ADLs  Z74.09 V49.89    Z78.9    3. Impaired instrumental activities of daily living (IADL)  Z78.9 V49.89   4. Impaired coordination of upper extremity  R27.8 781.3   5. Decreased  strength  R29.898 729.89        Patient History     Row Name 10/12/22 1515             History    Chief Complaint Muscle weakness;Fatigue/poor endurance  -      Date Current Problem(s) Began 22  -      Brief Description of Current Complaint Pt is a 66 yr old male who presents to OT evaluation after having a new CVA on 2022. Pt is known to this therapist for previous CVA this year. Pt reports he lives alone and woke up with impaired speech and motor skills. Reports he was able to get to his neighbor's house who called 911. Reports he was brought to [facility] and was life-flighted to [place]. Pt reports he was in the hospital for ~1wk. No sx. Reports went home after d/c from hospital. Pt lives alone in a 1 level home with steps to enter. No DME. Has a tub/shower.  -KH      Patient/Caregiver Goals Return to prior level of function;Improve strength  -KH      Current Tobacco Use yes  -KH      Smoking Status cigars  -KH      Patient's Rating of General Health Fair  -KH      Hand Dominance right-handed  -KH      Occupation/sports/leisure activities  "ride motorcycles, works on cars  -KH      Patient seeing anyone else for problem(s)? PT, SLP  -KH      What clinical tests have you had for this problem? CT scan;MRI  -KH      Results of Clinical Tests [facility]  -KH      Related/Recent Hospitalizations Yes  -KH      Date of Hospitalization 08/18/22  -KH      Location (Hospital Name) [facility]  -KH         Pain     Is your sleep disturbed? Yes  -KH      Is medication used to assist with sleep? 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 reports no difficulty with ADLs  -KH         Fall Risk Assessment    Any falls in the past year: Yes  -KH      Number of falls reported in the last 12 months \"several\"  -      Factors that contributed to the fall: Lost balance  -KH         Services    Prior Rehab/Home Health Experiences No  previously for 1st CVA early this year  -KH      Are you currently receiving Home Health services No  -KH      Do you plan to receive Home Health services in the near future No  -KH         Daily Activities    Primary Language English  -KH         Safety    Have you had any of the following issues with Depression;Panic Attacks  -KH            User Key  (r) = Recorded By, (t) = Taken By, (c) = Cosigned By    Initials Name Provider Type     Karolina Zee, OT Occupational Therapist                 OT Neuro     Row Name 10/12/22 1651             Home Living    Current Living Arrangements home  -KH      Home Accessibility stairs to enter home  -KH      Number of Stairs, Main Entrance two  -KH         Vision- Basic    Current Vision Wears glasses only for reading  -KH      Patient Visual Report Diplopia  reports occasional  -KH         Vision - Complex Assessment    Tracking Able to track stimulus in all quads without difficulty  -KH      Additional Comments educated pt to go to optometrist to further assess vision  -KH         Sensation "    Light Touch No apparent deficits  -KH      Sharp/Dull No apparent deficits  -KH         Perception    Inattention/Neglect Appears intact  -KH         General ROM    GENERAL ROM COMMENTS BUE AROM WFLs  -KH         MMT (Manual Muscle Testing)    General MMT Comments BUE grossly 5/5  -KH         Tone    UE Tone WFL  -KH            User Key  (r) = Recorded By, (t) = Taken By, (c) = Cosigned By    Initials Name Provider Type    Karolina Carcamo, RAVI Occupational Therapist                Hand Therapy (last 24 hours)     Hand Eval     Row Name 10/12/22 1515             Hand  Strength     Strength Affected Side Bilateral  -KH          Strength Right    Right  Test 1 40  -KH      Right  Test 2 20  -KH      Right  Test 3 20  -KH       Strength Average Right 26.67  -KH          Strength Left    Left  Test 1 20  -KH      Left  Test 2 20  -KH      Left  Test 3 20  -KH       Strength Average Left 20  -KH            User Key  (r) = Recorded By, (t) = Taken By, (c) = Cosigned By    Initials Name Provider Type    Karolina Carcamo OT Occupational Therapist                    Therapy Education  Education Details: OT POC  Program: New  How Provided: Verbal  Provided to: Patient  Level of Understanding: Verbalized     OT Goals     Row Name 10/12/22 1652 10/12/22 1515       OT Short Term Goals    STG 1 -- Pt will improve B  strength by 10# or more to demo improvement in  required for ADLs/IADLs.  -    STG 1 Progress -- New  -    STG 2 -- Pt will complete object manipulation/translation skills IND with less than 2 drops to improve FMC required for ADLs/IADLs.  -    STG 2 Progress -- New  -    STG 3 -- Pt will IND tie shoes with SBA to improve IND with LB dressing task.  -    STG 3 Progress -- New  -       Long Term Goals    LTG 1 -- Pt will actively participate in HEPs with emphasis on FMC/GMC, strengthening, and/or ADLs/IADLs.  -    LTG 1 Progress -- New  -SAULO     LTG 2 -- Pt will improve B 9 hole peg test to 30 secs or better to demo improvement in FMC required for ADLs/IADLs.  -    LTG 2 Progress -- New  -    LTG 3 -- Pt will improve B blocks and box test to 50 blocks or more to demo improvement in GMC required for ADLs/IADLs.  -    LTG 3 Progress -- New  -       Time Calculation    OT Goal Re-Cert Due Date 11/02/22  - 11/02/22  -          User Key  (r) = Recorded By, (t) = Taken By, (c) = Cosigned By    Initials Name Provider Type    Karolina Carcamo, OT Occupational Therapist                        OT Assessment/Plan     Row Name 10/12/22 8955          OT Assessment    Functional Limitations Decreased safety during functional activities;Limitation in home management;Performance in work activities;Performance in self-care ADL;Performance in leisure activities  -     Impairments Dexterity;Endurance;Coordination;Pain;Muscle strength  -     Assessment Comments Pt presents with decreased FMC/GMC, decreased  strength, and impaired ADLs/IADLs. Pt would benefit from continued skilled OT services to improve/further address above listed deficits. Without skilled OT services, pt is at risk for functional decline.  -     OT Diagnosis CVA  -     OT Rehab Potential Good  -     Patient/caregiver participated in establishment of treatment plan and goals Yes  -     Patient would benefit from skilled therapy intervention Yes  -KH        OT Plan    OT Frequency 1x/week  -     Predicted Duration of Therapy Intervention (OT) 4-6wks with further TBD  -     Planned CPT's? OT EVAL MOD COMPLEXITY: 78884;OT RE-EVAL: 97009;OT THER ACT EA 15 MIN: 07621SF;OT THER PROC EA 15 MIN: 50011ZI;OT NEUROMUSC RE EDUCATION EA 15 MIN: 22127;OT SELF CARE/MGMT/TRAIN 15 MIN: 14927;OT SENS INTEGRATIVE TECH EACH 15 MIN: 66658;OT THER SUPP EA 15 MIN:;OT CARE PLAN EA 15 MIN  -     Planned Therapy Interventions (Optional Details) strengthening;ROM (Range of Motion);patient/family  education;neuromuscular re-education;motor coordination training;home exercise program  -SAULO     OT Plan Comments Pt would benefit from skilled OT services 1x/wk for 4-6wks with further TBD.  -SAULO           User Key  (r) = Recorded By, (t) = Taken By, (c) = Cosigned By    Initials Name Provider Type    Karolina Carcamo OT Occupational Therapist                    Outcome Measure Options: Quick DASH, Box and Blocks, 9 Hole Peg  9 Hole Peg  9-Hole Peg Left: 34 secs  9-Hole Peg Right: 36 secs  Box and Blocks  Box and Blocks Left: 42  Box and Blocks Right: 38  Quick DASH  Open a tight or new jar.: Moderate Difficulty  Do heavy household chores (e.g., wash walls, wash floors): Mild Difficulty  Carry a shopping bag or briefcase: Moderate Difficulty  Wash your back: No Difficulty  Use a knife to cut food: No Difficulty  Recreational activities in which you take some force or impact through your arm, should or hand (e.g. golf, hammering, tennis, etc.): Mild Difficulty  During the past week, to what extent has your arm, shoulder, or hand problem interfered with your normal social activites with family, friends, neighbors or groups?: Not at all  During the past week, were you limited in your work or other regular daily activities as a result of your arm, shoulder or hand problem?: Not limited at all  Arm, Shoulder, or hand pain: Moderate  Tingling (pins and needles) in your arm, shoulder, or hand: None  During the past week, how much difficulty have you had sleeping because of the pain in your arm, shoulder or hand?: No difficulty  Number of Questions Answered: 11  Quick DASH Score: 18.18         Time Calculation:   OT Start Time: 1515  OT Stop Time: 1555  OT Time Calculation (min): 40 min     Therapy Charges for Today     Code Description Service Date Service Provider Modifiers Qty    [id number] HC OT EVAL MOD COMPLEXITY 3 10/12/2022 Karolina Zee OT GO 1                     Karolina Zee OT  10/12/2022

## 2022-10-12 NOTE — THERAPY EVALUATION
Outpatient Physical Therapy Ortho Initial Evaluation  [facility]     Patient Name: Filiberto Lynch  : 1955  MRN: [id number]  Today's Date: 10/12/2022      Visit Date: 10/12/2022     ATTENDANCE:   SUBJECTIVE IMPROVEMENT: not assessed at initial evaluation  NEXT MD APPOINTMENT: TBD  RECERT DATE: 22    THERAPY DIAGNOSIS: CVA- LE weakness, gait, and balance deficits       Patient Active Problem List   Diagnosis   • Facial numbness        Past Medical History:   Diagnosis Date   • Hypertension         Past Surgical History:   Procedure Laterality Date   • KNEE SURGERY Right     ORIF        Visit Dx:     ICD-10-CM ICD-9-CM   1. 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 Pt reports he lives alone and woke up with impaired speech and motor skills. Reports he was able to get to his neighbor's house who called 911. Reports he was brought to [facility] and was life-flighted to [place]. Pt reports he was in the hospital for ~1wk. No sx. Reports went home after d/c from hospital. Pt lives alone in a 1 level home with 3 steps to enter. No DME. Has a tub/shower. Pt also reports a hx of back pain for many years which is still bothering him. also notes that L knee seems to bother him with c/o arthritis that has been present prior to stroke.  -AC      Patient/Caregiver Goals Return to prior level of function  -AC      Current Tobacco Use yes  -AC      Smoking Status Cigars  -AC      Patient's Rating of General Health Fair  -AC      Hand Dominance right-handed  -AC       Occupation/sports/leisure activities ride motorcycles, works on cars  -AC      Patient seeing anyone else for problem(s)? PT, SLP  -AC      What clinical tests have you had for this problem? CT scan;MRI  -AC      Results of Clinical Tests L MCA  -AC      Related/Recent Hospitalizations Yes  -AC      Date of Hospitalization 08/18/22  -AC      Location (Hospital Name) [facility]  -AC         Pain     Pain Location Back;Leg  -AC      Pain at Present 2  -AC      Pain at Best 1  -AC      Pain at Worst 8  -AC      Pain Frequency Constant/continuous  -AC      Pain Description Aching  -AC      What Performance Factors Make the Current Problem(s) WORSE? extended standing/walking/sitting  -AC      What Performance Factors Make the Current Problem(s) BETTER? trying to move legs, reposition, rest, pain medication  -AC      Is your sleep disturbed? Yes  -AC      Is medication used to assist with sleep? Yes  -AC      Total hours of sleep per night 4-5 hours  -AC      Difficulties at work? currently unemployed. works in factory.  -AC      Difficulties with ADL's? has to take extra time and lower body dress and shoes/socks in seated rather than standing now.  -AC         Fall Risk Assessment    Any falls in the past year: Yes  -AC      Number of falls reported in the last 12 months 8  while having stroke trying to get out of house to neighbor. since d/c from hospital he has had a few stumbles however no true falls.  -AC      Factors that contributed to the fall: Lost balance  -AC         Daily Activities    Primary Language English  -AC            User Key  (r) = Recorded By, (t) = Taken By, (c) = Cosigned By    Initials Name Provider Type    AC Josselin Paredes, PT Physical Therapist                 PT Ortho     Row Name 10/12/22 1600       Subjective Comments    Subjective Comments see pt hx.  -AC       Precautions and Contraindications    Precautions/Limitations fall precautions  -AC       Subjective Pain    Able to rate  subjective pain? yes  -AC    Pre-Treatment Pain Level 2  -AC       MMT (Manual Muscle Testing)    General MMT Comments B hips grossly 4/5 except adduction 4-/5. knee and ankle strength grossly 4+/5 bilaterally.  -AC       Sensation    Additional Comments pt notes numbness/tingling from back to his knees which comes and goes.  -          User Key  (r) = Recorded By, (t) = Taken By, (c) = Cosigned By    Initials Name Provider Type     Josselin Paredes, PT Physical Therapist                                   PT OP Goals     Row Name 10/12/22 1600          PT Short Term Goals    STG Date to Achieve 11/09/22  -     STG 1 Pt is indpt with HEP.  -AC     STG 1 Progress New  -     STG 2 pt is able to complete sit to stand without UE assist.  -     STG 2 Progress New  -        Long Term Goals    LTG Date to Achieve 12/07/22  -     LTG 1 ZAYAS improved to 52/56 or better for decreased fall risk.  -AC     LTG 1 Progress New  -     LTG 2 Pt is able to complete 5TSTS in </= 18s.  -AC     LTG 2 Progress New  -     LTG 3 1000 ft or better in 6 MWT.  -AC     LTG 3 Progress New  -     LTG 4 Pt demos BLE MMT 4+/5 or better in all planes.  -     LTG 4 Progress New  -        Time Calculation    PT Goal Re-Cert Due Date 11/02/22  -           User Key  (r) = Recorded By, (t) = Taken By, (c) = Cosigned By    Initials Name Provider Type    AC Josselin Paredes, PT Physical Therapist                 PT Assessment/Plan     Row Name 10/12/22 1700          PT Assessment    Functional Limitations Decreased safety during functional activities;Impaired gait;Limitation in home management;Limitations in functional capacity and performance;Performance in leisure activities;Performance in self-care ADL  -AC     Impairments Balance;Gait;Muscle strength;Posture  -AC     Assessment Comments The pt is a 65 y/o male post L PCA CVA. He presents today with limited B hip strength, dynamic balance deficits and diffiuclty with  ambulation due to decreased foot clearance on swing phase with RLE. He has chronic back and L>R knee pain which limits him as well however notes that leg weakness and balance has been worse since the stroke. He will benefit from skilled PT to improve overall LE balance, strength, and gait mechanics to limit fall risk with functional mobility and improve towards PLOF.  -AC     Rehab Potential Fair  limited by chronic pain in back/knee  -AC     Patient/caregiver participated in establishment of treatment plan and goals Yes  -AC     Patient would benefit from skilled therapy intervention Yes  -AC        PT Plan    PT Frequency 1x/week  -AC     Predicted Duration of Therapy Intervention (PT) 4-6 weeks  -AC     PT Plan Comments LE stretching, strength. balance, coordination, and gait training. manual/modalities as needed  -           User Key  (r) = Recorded By, (t) = Taken By, (c) = Cosigned By    Initials Name Provider Type    Josselin Fine, PT Physical Therapist                                    Outcome Measure Options: 6 Minute Walk Test, 5x Sit to Stand, Nolasco Balance  5 Times Sit to Stand  5 Times Sit to Stand (seconds): 25.04 seconds  5 Times Sit to Stand Comments: LUE to push on knee to stand from standard height chair.  Nolasco Balance Scale  Sitting to Standing: able to stand independently using hands  Standing Unsupported: able to stand safely for 2 minutes  Sitting with Back Unsupported but Feet Supported on Floor or on Stool: able to sit safely and securely for 2 minutes  Standing to Sitting: controls descent by using hands  Transfers: able to transfer safely definite need of hands  Standing Unsupported with Eyes Closed: able to stand 10 seconds with supervision  Standing Unsupported with Feet Together: able to place feet together independently and stand 1 minute safely  Reaching Forward with Outstretched Arm While Standing: can reach forward 12 cm (5 inches)   Object From the Floor From a  Standing Position: able to  object but needs supervision  Turning to Look Behind Over Left and Right Shoulders While Standing: turns sideways only but maintains balance  Turn 360 Degrees: able to turn 360 degrees safely in 4 seconds or less  Place Alternate Foot on Step or Stool While Standing Unsupported: able to complete 4 steps without aid with supervision  Standing Unsupported with One Foot in Front: able to place foot tandem independently and hold 30 seconds (knee pain/knee feels like it is going to buckle)  Standing on One Leg: able to lift leg independently and hold 5-10 seconds (knee pain/knee feels like it is going to buckle)  Nolasco Total Score: 45         Time Calculation:     Start Time: 1556  Stop Time: 1643  Time Calculation (min): 47 min  Untimed Charges  PT Eval/Re-eval Minutes: 43  Total Minutes  Untimed Charges Total Minutes: 43   Total Minutes: 43     Therapy Charges for Today     Code Description Service Date Service Provider Modifiers Qty    99746277928 HC PT EVAL MOD COMPLEXITY 3 10/12/2022 Josselin Paredes, PT GP 1                   Josselin Paredes, PT  10/12/2022

## 2022-10-19 ENCOUNTER — HOSPITAL ENCOUNTER (OUTPATIENT)
Dept: PHYSICAL THERAPY | Facility: HOSPITAL | Age: 67
Setting detail: THERAPIES SERIES
Discharge: HOME | End: 2022-10-19

## 2022-10-19 DIAGNOSIS — I63.439 CEREBROVASCULAR ACCIDENT (CVA) DUE TO EMBOLISM OF POSTERIOR CEREBRAL ARTERY, UNSPECIFIED BLOOD VESSEL LATERALITY: ICD-10-CM

## 2022-10-19 DIAGNOSIS — I63.432: Primary | ICD-10-CM

## 2022-10-19 PROCEDURE — 97112 NEUROMUSCULAR REEDUCATION: CPT

## 2022-10-19 PROCEDURE — 97110 THERAPEUTIC EXERCISES: CPT

## 2022-10-19 NOTE — THERAPY TREATMENT NOTE
Outpatient Physical Therapy Neuro Treatment Note  [facility]     Patient Name: Filiberto Lynch  : 1955  MRN: [id number]  Today's Date: 10/19/2022      Visit Date: 10/19/2022  Subjective Improvement: n/a  MD visit: MIAN  Visit Number: 3/3  Total Approved:30  Recert Date: 2022  Visit Dx:    ICD-10-CM ICD-9-CM   1. 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 Cerebrovascular accident (CVA) due to embolism of posterior cerebral artery, unspecified blood vessel laterality ([facility])  I63.439 434.11       Patient Active Problem List   Diagnosis   • Facial numbness          PT Ortho     Row Name 10/19/22 1500       Subjective Comments    Subjective Comments Pt missed OT appt this date secondary to late for appt but PT had an opening.  -TL       Precautions and Contraindications    Precautions/Limitations fall precautions  -TL       Subjective Pain    Able to rate subjective pain? yes  -TL    Pre-Treatment Pain Level 5  -TL          User Key  (r) = Recorded By, (t) = Taken By, (c) = Cosigned By    Initials Name Provider Type    Lashanda Munguia PTA Physical Therapist Assistant                           PT Assessment/Plan     Row Name 10/19/22 1500          PT Assessment    Assessment Comments Pt met short term goal #2. Pt able to perform 10 sit to stands without UE. Pt given HEP for strengthening and SLS to help with balance using kitchen sink. No new c/o this date.  -TL        PT Plan    PT Frequency 1x/week  -TL     Predicted Duration of Therapy Intervention (PT) 4-6 weeks  -TL     PT Plan Comments continue with balance activities and start Clams in sidelying next visit and bridges if able.  -TL           User Key  (r) = Recorded By, (t) = Taken By, (c) = Cosigned By    Initials Name Provider Type    Lashanda Munguia PTA Physical Therapist Assistant                    OP Exercises     Row Name 10/19/22 1615              Subjective Comments    Subjective Comments Pt missed OT appt this date secondary to late for appt but PT had an opening.  -TL         Subjective Pain    Able to rate subjective pain? yes  -TL      Pre-Treatment Pain Level 5  -TL         Exercise 1    Exercise Name 1 Pro ll level 2.5 for LE  -TL      Time 1 10 mins  -TL      Additional Comments UE/LE  -TL         Exercise 2    Exercise Name 2 standing ham S  -TL      Sets 2 3  -TL      Time 2 30 sec hold both sides  -TL         Exercise 3    Exercise Name 3 step up fwd  -TL      Sets 3 1  -TL      Reps 3 10  -TL      Additional Comments both sides  -TL         Exercise 4    Exercise Name 4 SLS on right  -TL      Sets 4 3  -TL      Time 4 3-5 sec hold  -TL         Exercise 5    Exercise Name 5 SLS on left  -TL      Sets 5 3  -TL      Time 5 3-6 sec hold  -TL         Exercise 6    Exercise Name 6 LAQ  -TL      Sets 6 2  -TL      Reps 6 10  -TL      Time 6 5 sec hold  -TL      Additional Comments #2 both legs  -TL         Exercise 7    Exercise Name 7 Seated HS pulls GTB  -TL      Sets 7 2  -TL      Reps 7 10  -TL      Additional Comments both legs  -TL         Exercise 8    Exercise Name 8 sit to stands without UE support  -TL      Reps 8 10  -TL         Exercise 9    Exercise Name 9 Gait with #2 both legs  -TL      Reps 9 540 feet  -TL            User Key  (r) = Recorded By, (t) = Taken By, (c) = Cosigned By    Initials Name Provider Type    Lashanda Munguia, PTA Physical Therapist Assistant                             PT OP Goals     Row Name 10/19/22 1500          PT Short Term Goals    STG Date to Achieve 11/09/22  -TL     STG 1 Pt is indpt with HEP.  -TL     STG 1 Progress Ongoing  -TL     STG 2 pt is able to complete sit to stand without UE assist.  -TL     STG 2 Progress Met  -TL        Long Term Goals    LTG Date to Achieve 12/07/22  -TL     LTG 1 ZAYAS improved to 52/56 or better for decreased fall risk.  -TL     LTG 1 Progress New  -TL     LTG 2 Pt is able  to complete 5TSTS in </= 18s.  -TL     LTG 2 Progress New  -TL     LTG 3 1000 ft or better in 6 MWT.  -TL     LTG 3 Progress New  -TL     LTG 4 Pt demos BLE MMT 4+/5 or better in all planes.  -TL     LTG 4 Progress New  -TL        Time Calculation    PT Goal Re-Cert Due Date 11/02/22  -TL           User Key  (r) = Recorded By, (t) = Taken By, (c) = Cosigned By    Initials Name Provider Type    Lashanda Munguia PTA Physical Therapist Assistant                Therapy Education  Education Details: sit to stands, SLS at kitchen sink, ham curls GTB, LAQ GTB  Given: HEP  Program: New, Reinforced  How Provided: Verbal, Demonstration, Written  Provided to: Patient  Level of Understanding: Teach back education performed, Verbalized, Demonstrated              Time Calculation:   Start Time: 1521  Stop Time: 1623  Time Calculation (min): 62 min   Therapy Charges for Today     Code Description Service Date Service Provider Modifiers Qty    30094770092 HC PT THER PROC EA 15 MIN 10/19/2022 Lashanda Moya PTA GP, CQ 2    64701305646 HC PT NEUROMUSC RE EDUCATION EA 15 MIN 10/19/2022 Lashanda Moya PTA GP, CQ 1                    Lashanda Moya PTA  10/19/2022

## 2022-10-26 ENCOUNTER — HOSPITAL ENCOUNTER (OUTPATIENT)
Dept: PHYSICAL THERAPY | Facility: HOSPITAL | Age: 67
Setting detail: THERAPIES SERIES
Discharge: HOME | End: 2022-10-26

## 2022-10-26 DIAGNOSIS — I63.439 CEREBROVASCULAR ACCIDENT (CVA) DUE TO EMBOLISM OF POSTERIOR CEREBRAL ARTERY, UNSPECIFIED BLOOD VESSEL LATERALITY: ICD-10-CM

## 2022-10-26 DIAGNOSIS — I63.432: Primary | ICD-10-CM

## 2022-10-26 PROCEDURE — 97110 THERAPEUTIC EXERCISES: CPT

## 2022-10-26 NOTE — THERAPY TREATMENT NOTE
Outpatient Physical Therapy Ortho Treatment Note  [facility]     Patient Name: Filiberto Lynch  : 1955  MRN: [id number]  Today's Date: 10/26/2022      Visit Date: 10/26/2022  Subjective Improvement: n/a  MD visit: MIAN  Visit Number:   Total Approved:30  Recert Date: 2022  Visit Dx:    ICD-10-CM ICD-9-CM   1. 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 Cerebrovascular accident (CVA) due to embolism of posterior cerebral artery, unspecified blood vessel laterality ([facility])  I63.439 434.11       Patient Active Problem List   Diagnosis   • Facial numbness        Past Medical History:   Diagnosis Date   • Hypertension         Past Surgical History:   Procedure Laterality Date   • KNEE SURGERY Right     ORIF         PT Ortho     Row Name 10/26/22 1300       Subjective Comments    Subjective Comments Pt reports not having a good day.  Pt states that he was served with papers from [employer] by a .  -TL       Precautions and Contraindications    Precautions/Limitations fall precautions  -TL       Subjective Pain    Able to rate subjective pain? yes  -TL    Pre-Treatment Pain Level 6  -TL    Post-Treatment Pain Level 6  -TL       Posture/Observations    Posture/Observations Comments /80  -TL          User Key  (r) = Recorded By, (t) = Taken By, (c) = Cosigned By    Initials Name Provider Type    Lashanda Munguia PTA Physical Therapist Assistant                             PT Assessment/Plan     Row Name 10/26/22 1300          PT Assessment    Assessment Comments No new goals met today. pt able to complete 5 sit to stands in 25 sec. pt still struggles with balance SLS. PTA added bridging and clams with GTB for strengthening.  -TL        PT Plan    PT Frequency 1x/week  -TL     Predicted Duration of Therapy Intervention (PT) 4-6 weeks  -TL     PT Plan Comments continue with balance and do 6 mins walk test next  visit  -TL           User Key  (r) = Recorded By, (t) = Taken By, (c) = Cosigned By    Initials Name Provider Type    Lashanda Munguia PTA Physical Therapist Assistant                   OP Exercises     Row Name 10/26/22 1300             Subjective Comments    Subjective Comments Pt reports not having a good day.  Pt states that he was served with papers from [employer] by a .  -TL         Subjective Pain    Able to rate subjective pain? yes  -TL      Pre-Treatment Pain Level 6  -TL      Post-Treatment Pain Level 6  -TL         Exercise 1    Exercise Name 1 Pro ll legs for strengthening  -TL      Time 1 10 mins  -TL      Additional Comments LE  -TL         Exercise 2    Exercise Name 2 standing ham S  -TL      Sets 2 2  -TL      Time 2 30sec hold  -TL         Exercise 3    Exercise Name 3 SLS right  -TL      Sets 3 3  -TL      Time 3 9.07,4.75,6.32  -TL         Exercise 4    Exercise Name 4 SLS left  -TL      Sets 4 3  -TL      Time 4 5.20,3.82,7.87  -TL         Exercise 5    Exercise Name 5 sit to stand test without UE  -TL      Reps 5 5  -TL      Time 5 in 25.65 sec  -TL         Exercise 6    Exercise Name 6 feet together on airex pad  -TL      Reps 6 1 mins  -TL         Exercise 7    Exercise Name 7 feet apart eyes closed on airex pad  -TL      Time 7 1 mins  -TL      Additional Comments with alot of sway  -TL         Exercise 8    Exercise Name 8 bridge  -TL      Sets 8 2  -TL      Reps 8 10  -TL      Time 8 5 sec hold  -TL         Exercise 9    Exercise Name 9 clams GTB  -TL      Sets 9 2  -TL      Reps 9 10  -TL            User Key  (r) = Recorded By, (t) = Taken By, (c) = Cosigned By    Initials Name Provider Type    TL Lashanda Moya PTA Physical Therapist Assistant                              PT OP Goals     Row Name 10/26/22 1300          PT Short Term Goals    STG Date to Achieve 11/09/22  -TL     STG 1 Pt is indpt with HEP.  -TL     STG 1 Progress Ongoing  -TL     STG 2 pt is able to  complete sit to stand without UE assist.  -TL     STG 2 Progress Met  -TL        Long Term Goals    LTG Date to Achieve 12/07/22  -TL     LTG 1 ZAYAS improved to 52/56 or better for decreased fall risk.  -TL     LTG 1 Progress New  -TL     LTG 2 Pt is able to complete 5TSTS in </= 18s.  -TL     LTG 2 Progress Ongoing;Progressing  -TL     LTG 2 Progress Comments 25.65 sec hold  -TL     LTG 3 1000 ft or better in 6 MWT.  -TL     LTG 3 Progress New  -TL     LTG 4 Pt demos BLE MMT 4+/5 or better in all planes.  -TL     LTG 4 Progress New  -TL        Time Calculation    PT Goal Re-Cert Due Date 11/02/22  -TL           User Key  (r) = Recorded By, (t) = Taken By, (c) = Cosigned By    Initials Name Provider Type    Lashanda Munguia PTA Physical Therapist Assistant                Therapy Education  Education Details: clams GTB, bridging  Given: HEP  Program: New, Reinforced  How Provided: Verbal, Demonstration, Written  Provided to: Patient  Level of Understanding: Teach back education performed, Verbalized, Demonstrated              Time Calculation:   Start Time: 1302  Therapy Charges for Today     Code Description Service Date Service Provider Modifiers Qty    74768895452 HC PT THER PROC EA 15 MIN 10/26/2022 Lashanda Moya PTA GP, CQ 3                    Lashanda Moya PTA  10/26/2022